# Patient Record
Sex: MALE | Race: WHITE | Employment: OTHER | URBAN - METROPOLITAN AREA
[De-identification: names, ages, dates, MRNs, and addresses within clinical notes are randomized per-mention and may not be internally consistent; named-entity substitution may affect disease eponyms.]

---

## 2020-10-12 ENCOUNTER — APPOINTMENT (OUTPATIENT)
Dept: GENERAL RADIOLOGY | Age: 75
End: 2020-10-12
Payer: COMMERCIAL

## 2020-10-12 ENCOUNTER — HOSPITAL ENCOUNTER (EMERGENCY)
Age: 75
Discharge: PSYCHIATRIC OR SUBSTANCE USE FACILITY | End: 2020-10-12
Attending: EMERGENCY MEDICINE
Payer: COMMERCIAL

## 2020-10-12 ENCOUNTER — APPOINTMENT (OUTPATIENT)
Dept: CT IMAGING | Age: 75
End: 2020-10-12
Payer: COMMERCIAL

## 2020-10-12 VITALS
HEART RATE: 86 BPM | DIASTOLIC BLOOD PRESSURE: 88 MMHG | TEMPERATURE: 97.7 F | OXYGEN SATURATION: 93 % | SYSTOLIC BLOOD PRESSURE: 183 MMHG | RESPIRATION RATE: 20 BRPM

## 2020-10-12 LAB
ANION GAP SERPL CALCULATED.3IONS-SCNC: 15 MMOL/L (ref 3–16)
BASE EXCESS VENOUS: 1.4 MMOL/L (ref -2–3)
BASOPHILS ABSOLUTE: 0 K/UL (ref 0–0.2)
BASOPHILS RELATIVE PERCENT: 0.1 %
BILIRUBIN URINE: NEGATIVE
BLOOD, URINE: ABNORMAL
BUN BLDV-MCNC: 37 MG/DL (ref 7–20)
CALCIUM SERPL-MCNC: 9.3 MG/DL (ref 8.3–10.6)
CARBOXYHEMOGLOBIN: 2.6 % (ref 0–1.5)
CHLORIDE BLD-SCNC: 106 MMOL/L (ref 99–110)
CLARITY: CLEAR
CO2: 24 MMOL/L (ref 21–32)
COLOR: YELLOW
CREAT SERPL-MCNC: 1.2 MG/DL (ref 0.8–1.3)
EKG ATRIAL RATE: 86 BPM
EKG DIAGNOSIS: NORMAL
EKG P AXIS: 50 DEGREES
EKG P-R INTERVAL: 174 MS
EKG Q-T INTERVAL: 384 MS
EKG QRS DURATION: 88 MS
EKG QTC CALCULATION (BAZETT): 459 MS
EKG R AXIS: -2 DEGREES
EKG T AXIS: 43 DEGREES
EKG VENTRICULAR RATE: 86 BPM
EOSINOPHILS ABSOLUTE: 0 K/UL (ref 0–0.6)
EOSINOPHILS RELATIVE PERCENT: 0 %
GFR AFRICAN AMERICAN: >60
GFR NON-AFRICAN AMERICAN: 59
GLUCOSE BLD-MCNC: 247 MG/DL (ref 70–99)
GLUCOSE URINE: NEGATIVE MG/DL
HCO3 VENOUS: 25.6 MMOL/L (ref 24–28)
HCT VFR BLD CALC: 43.9 % (ref 40.5–52.5)
HEMOGLOBIN, VEN, REDUCED: 26.9 %
HEMOGLOBIN: 14.8 G/DL (ref 13.5–17.5)
KETONES, URINE: ABNORMAL MG/DL
LACTIC ACID: 1.9 MMOL/L (ref 0.4–2)
LEUKOCYTE ESTERASE, URINE: NEGATIVE
LYMPHOCYTES ABSOLUTE: 0.4 K/UL (ref 1–5.1)
LYMPHOCYTES RELATIVE PERCENT: 2.9 %
MCH RBC QN AUTO: 29.8 PG (ref 26–34)
MCHC RBC AUTO-ENTMCNC: 33.6 G/DL (ref 31–36)
MCV RBC AUTO: 88.7 FL (ref 80–100)
METHEMOGLOBIN VENOUS: 0.4 % (ref 0–1.5)
MICROSCOPIC EXAMINATION: YES
MONOCYTES ABSOLUTE: 0.7 K/UL (ref 0–1.3)
MONOCYTES RELATIVE PERCENT: 5.2 %
NEUTROPHILS ABSOLUTE: 12.5 K/UL (ref 1.7–7.7)
NEUTROPHILS RELATIVE PERCENT: 91.8 %
NITRITE, URINE: NEGATIVE
O2 SAT, VEN: 72 %
PCO2, VEN: 40.8 MMHG (ref 41–51)
PDW BLD-RTO: 13.8 % (ref 12.4–15.4)
PH UA: 6 (ref 5–8)
PH VENOUS: 7.41 (ref 7.35–7.45)
PLATELET # BLD: 180 K/UL (ref 135–450)
PMV BLD AUTO: 8 FL (ref 5–10.5)
PO2, VEN: 39 MMHG (ref 25–40)
POTASSIUM REFLEX MAGNESIUM: 4.4 MMOL/L (ref 3.5–5.1)
PRO-BNP: 419 PG/ML (ref 0–449)
PROTEIN UA: NEGATIVE MG/DL
RBC # BLD: 4.95 M/UL (ref 4.2–5.9)
RBC UA: ABNORMAL /HPF (ref 0–4)
SODIUM BLD-SCNC: 145 MMOL/L (ref 136–145)
SPECIFIC GRAVITY UA: 1.02 (ref 1–1.03)
TCO2 CALC VENOUS: 27 MMOL/L
TROPONIN: <0.01 NG/ML
URINE REFLEX TO CULTURE: ABNORMAL
URINE TYPE: ABNORMAL
UROBILINOGEN, URINE: 0.2 E.U./DL
VALPROIC ACID LEVEL: <2.8 UG/ML (ref 50–100)
WBC # BLD: 13.7 K/UL (ref 4–11)
WBC UA: ABNORMAL /HPF (ref 0–5)

## 2020-10-12 PROCEDURE — 36415 COLL VENOUS BLD VENIPUNCTURE: CPT

## 2020-10-12 PROCEDURE — 93005 ELECTROCARDIOGRAM TRACING: CPT | Performed by: PHYSICIAN ASSISTANT

## 2020-10-12 PROCEDURE — 85025 COMPLETE CBC W/AUTO DIFF WBC: CPT

## 2020-10-12 PROCEDURE — 99285 EMERGENCY DEPT VISIT HI MDM: CPT

## 2020-10-12 PROCEDURE — 2580000003 HC RX 258: Performed by: PHYSICIAN ASSISTANT

## 2020-10-12 PROCEDURE — 83605 ASSAY OF LACTIC ACID: CPT

## 2020-10-12 PROCEDURE — 81001 URINALYSIS AUTO W/SCOPE: CPT

## 2020-10-12 PROCEDURE — 70450 CT HEAD/BRAIN W/O DYE: CPT

## 2020-10-12 PROCEDURE — U0003 INFECTIOUS AGENT DETECTION BY NUCLEIC ACID (DNA OR RNA); SEVERE ACUTE RESPIRATORY SYNDROME CORONAVIRUS 2 (SARS-COV-2) (CORONAVIRUS DISEASE [COVID-19]), AMPLIFIED PROBE TECHNIQUE, MAKING USE OF HIGH THROUGHPUT TECHNOLOGIES AS DESCRIBED BY CMS-2020-01-R: HCPCS

## 2020-10-12 PROCEDURE — 83880 ASSAY OF NATRIURETIC PEPTIDE: CPT

## 2020-10-12 PROCEDURE — 71045 X-RAY EXAM CHEST 1 VIEW: CPT

## 2020-10-12 PROCEDURE — 82803 BLOOD GASES ANY COMBINATION: CPT

## 2020-10-12 PROCEDURE — 80164 ASSAY DIPROPYLACETIC ACD TOT: CPT

## 2020-10-12 PROCEDURE — 80048 BASIC METABOLIC PNL TOTAL CA: CPT

## 2020-10-12 PROCEDURE — 84484 ASSAY OF TROPONIN QUANT: CPT

## 2020-10-12 RX ORDER — QUETIAPINE FUMARATE 25 MG/1
25 TABLET, FILM COATED ORAL 2 TIMES DAILY
Status: ON HOLD | COMMUNITY
End: 2020-10-20 | Stop reason: HOSPADM

## 2020-10-12 RX ORDER — 0.9 % SODIUM CHLORIDE 0.9 %
1000 INTRAVENOUS SOLUTION INTRAVENOUS ONCE
Status: COMPLETED | OUTPATIENT
Start: 2020-10-12 | End: 2020-10-12

## 2020-10-12 RX ORDER — QUETIAPINE FUMARATE 25 MG/1
50 TABLET, FILM COATED ORAL NIGHTLY
Status: ON HOLD | COMMUNITY
End: 2020-10-20 | Stop reason: HOSPADM

## 2020-10-12 RX ORDER — DIVALPROEX SODIUM 250 MG/1
250 TABLET, DELAYED RELEASE ORAL 2 TIMES DAILY
Status: ON HOLD | COMMUNITY
End: 2020-10-20 | Stop reason: HOSPADM

## 2020-10-12 RX ORDER — ACETAMINOPHEN 325 MG/1
650 TABLET ORAL EVERY 6 HOURS PRN
Status: ON HOLD | COMMUNITY
End: 2020-10-20 | Stop reason: HOSPADM

## 2020-10-12 RX ORDER — ESCITALOPRAM OXALATE 10 MG/1
10 TABLET ORAL DAILY
Status: ON HOLD | COMMUNITY
End: 2020-10-20 | Stop reason: HOSPADM

## 2020-10-12 RX ORDER — OYSTER SHELL CALCIUM WITH VITAMIN D 500; 200 MG/1; [IU]/1
1 TABLET, FILM COATED ORAL DAILY
Status: ON HOLD | COMMUNITY
End: 2020-10-20 | Stop reason: HOSPADM

## 2020-10-12 RX ORDER — TRAZODONE HYDROCHLORIDE 100 MG/1
50 TABLET ORAL NIGHTLY
Status: ON HOLD | COMMUNITY
End: 2020-10-16 | Stop reason: CLARIF

## 2020-10-12 RX ORDER — LORAZEPAM 2 MG/ML
1 INJECTION INTRAMUSCULAR EVERY 6 HOURS PRN
Status: ON HOLD | COMMUNITY
End: 2020-10-20 | Stop reason: HOSPADM

## 2020-10-12 RX ADMIN — SODIUM CHLORIDE 1000 ML: 0.9 INJECTION, SOLUTION INTRAVENOUS at 10:28

## 2020-10-12 NOTE — ED PROVIDER NOTES
[address] ENCOUNTER          PHYSICIAN ASSISTANT NOTE       Date of evaluation: 10/12/2020    Chief Complaint     Altered Mental Status (From THE [facility] w AMS and dehydration)      History of Present Illness     Divya Harper is a 66 y.o. male with a past medical history of Alzheimer's dementia who has been at THE [facility] for the last few days due to aggressive behavior presenting to the emergency department for reported altered mental status. Normally he is interactive with staff, able to ambulate on his own and walk to the bathroom. This morning per report from EMS and nursing staff he was only responsive to motor stimuli and refused to get out of bed. They are also concerned about dehydration. Patient is asking for water, otherwise has no complaints. No reported falls or injury. Otherwise patient is noncontributory to history. Vitals were normal per EMS report. Review of Systems     Review of Systems   Unable to perform ROS: Dementia       Past Medical, Surgical, Family, and Social History     He has a past medical history of Arthritis, Cancer ([address] and Glaucoma. He has a past surgical history that includes Yag capsulotomy (Left, 11.23.93); Cataract removal (Left, 09.05.1990); Cataract removal (Right, 01.16.1991); Heel spur surgery (Right, 1980); and Circumcision (1980). His family history includes Blindness in his mother; Cancer in his father; Cataracts in his father and mother; Diabetes in his father. He reports that he quit smoking about 49 years ago. He has never used smokeless tobacco. He reports that he does not drink alcohol or use drugs.     Medications     Discharge Medication List as of 10/12/2020  2:49 PM      CONTINUE these medications which have NOT CHANGED    Details   escitalopram (LEXAPRO) 10 MG tablet Take 10 mg by mouth dailyHistorical Med      calcium-vitamin D (OSCAL-500) 500-200 MG-UNIT per tablet Take 1 tablet by mouth Musculoskeletal: Normal range of motion. General: No swelling. Neurological:      Comments: No facial asymmetry, will state his name, not alert to place or time, moving all extremities spontaneously, gait was not assessed, pupils equal round and responsive, extraocular eye movements normal       Diagnostic Results     EKG   Interpreted in conjunction with emergency department physician Gordo Palomino MD  Rhythm: normal sinus   Rate: normal  Axis: normal  Ectopy: none  Conduction: normal  ST Segments: normal  T Waves: inversion in  v4, v5 and v6  Q Waves:none  Clinical Impression: Normal sinus rhythm, T wave inversion in leads V4 through V6 with flattening in inferior leads, no previous for comparison    RADIOLOGY:  CT Head WO Contrast   Final Result      1. No acute intracranial abnormality. 2. Mild nonspecific periventricular white matter hypodensity compatible with chronic small vessel ischemic disease. XR CHEST PORTABLE   Final Result      1. Minimal airspace disease right base. 2. Cardiomegaly.                 LABS:   Results for orders placed or performed during the hospital encounter of 10/12/20   CBC Auto Differential   Result Value Ref Range    WBC 13.7 (H) 4.0 - 11.0 K/uL    RBC 4.95 4.20 - 5.90 M/uL    Hemoglobin 14.8 13.5 - 17.5 g/dL    Hematocrit 43.9 40.5 - 52.5 %    MCV 88.7 80.0 - 100.0 fL    MCH 29.8 26.0 - 34.0 pg    MCHC 33.6 31.0 - 36.0 g/dL    RDW 13.8 12.4 - 15.4 %    Platelets 816 121 - 664 K/uL    MPV 8.0 5.0 - 10.5 fL    Neutrophils % 91.8 %    Lymphocytes % 2.9 %    Monocytes % 5.2 %    Eosinophils % 0.0 %    Basophils % 0.1 %    Neutrophils Absolute 12.5 (H) 1.7 - 7.7 K/uL    Lymphocytes Absolute 0.4 (L) 1.0 - 5.1 K/uL    Monocytes Absolute 0.7 0.0 - 1.3 K/uL    Eosinophils Absolute 0.0 0.0 - 0.6 K/uL    Basophils Absolute 0.0 0.0 - 0.2 K/uL   Basic Metabolic Panel w/ Reflex to MG   Result Value Ref Range    Sodium 145 136 - 145 mmol/L    Potassium reflex Magnesium 4.4 3.5 ms    P Axis 50 degrees    R Axis -2 degrees    T Axis 43 degrees    Diagnosis       EKG performed in ER and to be interpreted by ER physician. Confirmed by MD, ER ([id number]),  Elmer Bradshaw ([id number]) on 10/12/2020 10:37:35 AM       ED BEDSIDE ULTRASOUND:  None    RECENT VITALS:  BP: (!) 183/88, Temp: 97.7 °F (36.5 °C), Pulse: 86, Resp: 20, SpO2: 93 %     Procedures   None    ED Course     Nursing Notes, Past Medical Hx,Past Surgical Hx, Social Hx, Allergies, and Family Hx were reviewed. The patient was given the following medications:  Orders Placed This Encounter   Medications    0.9 % sodium chloride bolus       CONSULTS:  None    MEDICAL DECISION MAKING / ASSESSMENT / Bismark Tara is a 66 y.o. male presenting to the emergency department for evaluation of altered mental status, sent from THE [facility] where he is currently inpatient for aggression that was noted by his family members. He is having his medications adjusted, and this morning staff thought that he was slightly altered. He was only responsive to motor stimuli per report from THE [facility], although he was cooperative with exam in the ED, would open his eyes, moving all of his extremities spontaneously, alert to person, not place or time. He was intermittently compliant with exam, occasionally combative. High suspicion for new medications as a cause of his reported mental status changes over the last day while at the nursing facility. He is being treated for agitation and aggression. Work-up today showed no significant abnormalities. CT head completed showing no acute intracranial abnormality with chronic small vessel ischemic disease. Chest x-ray did show minimal airspace disease at the right base with no reported cough, no shortness of breath, and no hypoxia seen on exam.  Oxygen saturations of 93 were documented although he was high 90s throughout my encounter. Covid testing was also sent and pending.   BNP normal, single troponin drawn and normal.  Urinalysis showed no evidence of urinary tract infection. No obvious source of infection identified as cause of change in mental status today. Felt that he was appropriate to return back to [facility] with strict return precautions. Did discuss repeat vitals with the nursing staff, although they are not documented did state that there was improvement in blood pressure with normal heart rate. This patient was also evaluated by the attending physician. All care plans were discussed and agreed upon. Clinical Impression     1. Altered mental status, unspecified altered mental status type        Disposition     PATIENT REFERRED TO:  No follow-up provider specified.     DISCHARGE MEDICATIONS:  Discharge Medication List as of 10/12/2020  2:49 PM          DISPOSITION         Kisha Cat PA-C  10/12/20 5527

## 2020-10-12 NOTE — ED PROVIDER NOTES
ED Attending Attestation Note     Date of evaluation: 10/12/2020    This patient was seen by the advance practice provider. I have seen and examined the patient, agree with the workup, evaluation, management and diagnosis. The care plan has been discussed. I have reviewed the ECG and concur with the MILLA's interpretation. My assessment reveals an elderly patient, confused but with no focal neurologic deficits, in no acute distress. He is brought to the emergency department from THE [facility], with decreased mental status and interactivity after recently having been admitted there for behavioral difficulties, with violent and aggressive behavior. It appears that he has recently been started on several sedating medications for behavior control at THE [facility], which is likely contributing to his alteration of mental status. Infectious and metabolic abnormalities are also being evaluated for.          Gordo Palomino MD  10/12/20 1536

## 2020-10-12 NOTE — ED NOTES
Report called to JAIDA Petersen at THE [facility].       Alexy Ortiz RN  10/12/20 7206

## 2020-10-13 PROCEDURE — U0003 INFECTIOUS AGENT DETECTION BY NUCLEIC ACID (DNA OR RNA); SEVERE ACUTE RESPIRATORY SYNDROME CORONAVIRUS 2 (SARS-COV-2) (CORONAVIRUS DISEASE [COVID-19]), AMPLIFIED PROBE TECHNIQUE, MAKING USE OF HIGH THROUGHPUT TECHNOLOGIES AS DESCRIBED BY CMS-2020-01-R: HCPCS

## 2020-10-14 LAB — SARS-COV-2, NAA: NOT DETECTED

## 2020-10-16 ENCOUNTER — HOSPITAL ENCOUNTER (INPATIENT)
Age: 75
LOS: 4 days | Discharge: HOSPICE | DRG: 682 | End: 2020-10-20
Attending: EMERGENCY MEDICINE | Admitting: INTERNAL MEDICINE
Payer: MEDICARE

## 2020-10-16 ENCOUNTER — APPOINTMENT (OUTPATIENT)
Dept: CT IMAGING | Age: 75
DRG: 682 | End: 2020-10-16
Payer: MEDICARE

## 2020-10-16 PROBLEM — N17.9 AKI (ACUTE KIDNEY INJURY) (HCC): Status: ACTIVE | Noted: 2020-10-16

## 2020-10-16 LAB
ALBUMIN SERPL-MCNC: 4.1 G/DL (ref 3.4–5)
ALP BLD-CCNC: 111 U/L (ref 40–129)
ALT SERPL-CCNC: 29 U/L (ref 10–40)
ANION GAP SERPL CALCULATED.3IONS-SCNC: 15 MMOL/L (ref 3–16)
AST SERPL-CCNC: 17 U/L (ref 15–37)
BACTERIA: ABNORMAL /HPF
BASE EXCESS VENOUS: 0.1 MMOL/L (ref -2–3)
BASOPHILS ABSOLUTE: 0 K/UL (ref 0–0.2)
BASOPHILS RELATIVE PERCENT: 0.2 %
BILIRUB SERPL-MCNC: 0.9 MG/DL (ref 0–1)
BILIRUBIN DIRECT: <0.2 MG/DL (ref 0–0.3)
BILIRUBIN URINE: NEGATIVE
BILIRUBIN, INDIRECT: NORMAL MG/DL (ref 0–1)
BLOOD, URINE: ABNORMAL
BUN BLDV-MCNC: 103 MG/DL (ref 7–20)
CALCIUM SERPL-MCNC: 9.2 MG/DL (ref 8.3–10.6)
CARBOXYHEMOGLOBIN: 1.5 % (ref 0–1.5)
CHLORIDE BLD-SCNC: 110 MMOL/L (ref 99–110)
CLARITY: CLEAR
CO2: 23 MMOL/L (ref 21–32)
COLOR: YELLOW
CREAT SERPL-MCNC: 3.8 MG/DL (ref 0.8–1.3)
EKG ATRIAL RATE: 73 BPM
EKG DIAGNOSIS: NORMAL
EKG P AXIS: 62 DEGREES
EKG P-R INTERVAL: 156 MS
EKG Q-T INTERVAL: 408 MS
EKG QRS DURATION: 92 MS
EKG QTC CALCULATION (BAZETT): 449 MS
EKG R AXIS: 17 DEGREES
EKG T AXIS: -9 DEGREES
EKG VENTRICULAR RATE: 73 BPM
EOSINOPHILS ABSOLUTE: 0 K/UL (ref 0–0.6)
EOSINOPHILS RELATIVE PERCENT: 0 %
GFR AFRICAN AMERICAN: 19
GFR NON-AFRICAN AMERICAN: 16
GLUCOSE BLD-MCNC: 207 MG/DL (ref 70–99)
GLUCOSE BLD-MCNC: 248 MG/DL (ref 70–99)
GLUCOSE BLD-MCNC: 252 MG/DL (ref 70–99)
GLUCOSE URINE: 100 MG/DL
HCO3 VENOUS: 25.2 MMOL/L (ref 24–28)
HCT VFR BLD CALC: 38.5 % (ref 40.5–52.5)
HEMOGLOBIN, VEN, REDUCED: 61.1 %
HEMOGLOBIN: 12.9 G/DL (ref 13.5–17.5)
KETONES, URINE: NEGATIVE MG/DL
LACTIC ACID: 2 MMOL/L (ref 0.4–2)
LEUKOCYTE ESTERASE, URINE: ABNORMAL
LIPASE: 40 U/L (ref 13–60)
LYMPHOCYTES ABSOLUTE: 0.3 K/UL (ref 1–5.1)
LYMPHOCYTES RELATIVE PERCENT: 3.3 %
MCH RBC QN AUTO: 30.2 PG (ref 26–34)
MCHC RBC AUTO-ENTMCNC: 33.6 G/DL (ref 31–36)
MCV RBC AUTO: 90 FL (ref 80–100)
METHEMOGLOBIN VENOUS: 0.8 % (ref 0–1.5)
MICROSCOPIC EXAMINATION: YES
MONOCYTES ABSOLUTE: 0.9 K/UL (ref 0–1.3)
MONOCYTES RELATIVE PERCENT: 8.3 %
NEUTROPHILS ABSOLUTE: 9.2 K/UL (ref 1.7–7.7)
NEUTROPHILS RELATIVE PERCENT: 88.2 %
NITRITE, URINE: NEGATIVE
O2 SAT, VEN: 38 %
PCO2, VEN: 45.3 MMHG (ref 41–51)
PDW BLD-RTO: 13.6 % (ref 12.4–15.4)
PERFORMED ON: ABNORMAL
PERFORMED ON: ABNORMAL
PH UA: 6 (ref 5–8)
PH VENOUS: 7.36 (ref 7.35–7.45)
PLATELET # BLD: 146 K/UL (ref 135–450)
PMV BLD AUTO: 8.6 FL (ref 5–10.5)
PO2, VEN: 23.6 MMHG (ref 25–40)
POTASSIUM REFLEX MAGNESIUM: 4.2 MMOL/L (ref 3.5–5.1)
PROTEIN UA: NEGATIVE MG/DL
RBC # BLD: 4.28 M/UL (ref 4.2–5.9)
RBC UA: ABNORMAL /HPF (ref 0–4)
SODIUM BLD-SCNC: 148 MMOL/L (ref 136–145)
SPECIFIC GRAVITY UA: 1.02 (ref 1–1.03)
TCO2 CALC VENOUS: 27 MMOL/L
TOTAL PROTEIN: 6.7 G/DL (ref 6.4–8.2)
URINE TYPE: ABNORMAL
UROBILINOGEN, URINE: 0.2 E.U./DL
WBC # BLD: 10.5 K/UL (ref 4–11)
WBC UA: ABNORMAL /HPF (ref 0–5)

## 2020-10-16 PROCEDURE — 6360000002 HC RX W HCPCS: Performed by: STUDENT IN AN ORGANIZED HEALTH CARE EDUCATION/TRAINING PROGRAM

## 2020-10-16 PROCEDURE — 6370000000 HC RX 637 (ALT 250 FOR IP): Performed by: INTERNAL MEDICINE

## 2020-10-16 PROCEDURE — 1200000000 HC SEMI PRIVATE

## 2020-10-16 PROCEDURE — U0003 INFECTIOUS AGENT DETECTION BY NUCLEIC ACID (DNA OR RNA); SEVERE ACUTE RESPIRATORY SYNDROME CORONAVIRUS 2 (SARS-COV-2) (CORONAVIRUS DISEASE [COVID-19]), AMPLIFIED PROBE TECHNIQUE, MAKING USE OF HIGH THROUGHPUT TECHNOLOGIES AS DESCRIBED BY CMS-2020-01-R: HCPCS

## 2020-10-16 PROCEDURE — 85025 COMPLETE CBC W/AUTO DIFF WBC: CPT

## 2020-10-16 PROCEDURE — U0002 COVID-19 LAB TEST NON-CDC: HCPCS

## 2020-10-16 PROCEDURE — 82803 BLOOD GASES ANY COMBINATION: CPT

## 2020-10-16 PROCEDURE — 96375 TX/PRO/DX INJ NEW DRUG ADDON: CPT

## 2020-10-16 PROCEDURE — 2580000003 HC RX 258: Performed by: STUDENT IN AN ORGANIZED HEALTH CARE EDUCATION/TRAINING PROGRAM

## 2020-10-16 PROCEDURE — 93005 ELECTROCARDIOGRAM TRACING: CPT | Performed by: STUDENT IN AN ORGANIZED HEALTH CARE EDUCATION/TRAINING PROGRAM

## 2020-10-16 PROCEDURE — 2580000003 HC RX 258: Performed by: INTERNAL MEDICINE

## 2020-10-16 PROCEDURE — 74176 CT ABD & PELVIS W/O CONTRAST: CPT

## 2020-10-16 PROCEDURE — 83690 ASSAY OF LIPASE: CPT

## 2020-10-16 PROCEDURE — 83605 ASSAY OF LACTIC ACID: CPT

## 2020-10-16 PROCEDURE — 87086 URINE CULTURE/COLONY COUNT: CPT

## 2020-10-16 PROCEDURE — 6360000002 HC RX W HCPCS: Performed by: INTERNAL MEDICINE

## 2020-10-16 PROCEDURE — 99285 EMERGENCY DEPT VISIT HI MDM: CPT

## 2020-10-16 PROCEDURE — 80076 HEPATIC FUNCTION PANEL: CPT

## 2020-10-16 PROCEDURE — 96374 THER/PROPH/DIAG INJ IV PUSH: CPT

## 2020-10-16 PROCEDURE — 2580000003 HC RX 258

## 2020-10-16 PROCEDURE — 51702 INSERT TEMP BLADDER CATH: CPT

## 2020-10-16 PROCEDURE — 81001 URINALYSIS AUTO W/SCOPE: CPT

## 2020-10-16 PROCEDURE — 80048 BASIC METABOLIC PNL TOTAL CA: CPT

## 2020-10-16 RX ORDER — ACETAMINOPHEN 325 MG/1
650 TABLET ORAL EVERY 6 HOURS PRN
Status: DISCONTINUED | OUTPATIENT
Start: 2020-10-16 | End: 2020-10-20 | Stop reason: HOSPADM

## 2020-10-16 RX ORDER — DEXTROSE MONOHYDRATE 25 G/50ML
12.5 INJECTION, SOLUTION INTRAVENOUS PRN
Status: DISCONTINUED | OUTPATIENT
Start: 2020-10-16 | End: 2020-10-20 | Stop reason: HOSPADM

## 2020-10-16 RX ORDER — POLYETHYLENE GLYCOL 3350 17 G/17G
17 POWDER, FOR SOLUTION ORAL DAILY PRN
Status: DISCONTINUED | OUTPATIENT
Start: 2020-10-16 | End: 2020-10-20 | Stop reason: HOSPADM

## 2020-10-16 RX ORDER — LORAZEPAM 2 MG/ML
1 INJECTION INTRAMUSCULAR EVERY 6 HOURS PRN
Status: DISCONTINUED | OUTPATIENT
Start: 2020-10-16 | End: 2020-10-19

## 2020-10-16 RX ORDER — SODIUM CHLORIDE 0.9 % (FLUSH) 0.9 %
10 SYRINGE (ML) INJECTION PRN
Status: DISCONTINUED | OUTPATIENT
Start: 2020-10-16 | End: 2020-10-20 | Stop reason: HOSPADM

## 2020-10-16 RX ORDER — ZIPRASIDONE MESYLATE 20 MG/ML
10 INJECTION, POWDER, LYOPHILIZED, FOR SOLUTION INTRAMUSCULAR ONCE
Status: COMPLETED | OUTPATIENT
Start: 2020-10-16 | End: 2020-10-16

## 2020-10-16 RX ORDER — HEPARIN SODIUM 5000 [USP'U]/ML
5000 INJECTION, SOLUTION INTRAVENOUS; SUBCUTANEOUS EVERY 8 HOURS SCHEDULED
Status: DISCONTINUED | OUTPATIENT
Start: 2020-10-16 | End: 2020-10-20 | Stop reason: HOSPADM

## 2020-10-16 RX ORDER — INSULIN LISPRO 100 [IU]/ML
0-3 INJECTION, SOLUTION INTRAVENOUS; SUBCUTANEOUS NIGHTLY
Status: DISCONTINUED | OUTPATIENT
Start: 2020-10-16 | End: 2020-10-20 | Stop reason: HOSPADM

## 2020-10-16 RX ORDER — LORAZEPAM 1 MG/1
1 TABLET ORAL EVERY 6 HOURS PRN
Status: ON HOLD | COMMUNITY
End: 2020-10-20 | Stop reason: HOSPADM

## 2020-10-16 RX ORDER — CEPHALEXIN 500 MG/1
500 CAPSULE ORAL 4 TIMES DAILY
Status: ON HOLD | COMMUNITY
Start: 2020-10-15 | End: 2020-10-20 | Stop reason: HOSPADM

## 2020-10-16 RX ORDER — SODIUM CHLORIDE, SODIUM LACTATE, POTASSIUM CHLORIDE, CALCIUM CHLORIDE 600; 310; 30; 20 MG/100ML; MG/100ML; MG/100ML; MG/100ML
INJECTION, SOLUTION INTRAVENOUS
Status: COMPLETED
Start: 2020-10-16 | End: 2020-10-16

## 2020-10-16 RX ORDER — SODIUM CHLORIDE 0.9 % (FLUSH) 0.9 %
10 SYRINGE (ML) INJECTION EVERY 12 HOURS SCHEDULED
Status: DISCONTINUED | OUTPATIENT
Start: 2020-10-16 | End: 2020-10-20 | Stop reason: HOSPADM

## 2020-10-16 RX ORDER — MIDAZOLAM HYDROCHLORIDE 1 MG/ML
2 INJECTION INTRAMUSCULAR; INTRAVENOUS ONCE
Status: COMPLETED | OUTPATIENT
Start: 2020-10-16 | End: 2020-10-16

## 2020-10-16 RX ORDER — TRAZODONE HYDROCHLORIDE 50 MG/1
25 TABLET ORAL NIGHTLY PRN
Status: ON HOLD | COMMUNITY
End: 2020-10-20 | Stop reason: HOSPADM

## 2020-10-16 RX ORDER — ACETAMINOPHEN 650 MG/1
650 SUPPOSITORY RECTAL EVERY 6 HOURS PRN
Status: DISCONTINUED | OUTPATIENT
Start: 2020-10-16 | End: 2020-10-20 | Stop reason: HOSPADM

## 2020-10-16 RX ORDER — NICOTINE POLACRILEX 4 MG
15 LOZENGE BUCCAL PRN
Status: DISCONTINUED | OUTPATIENT
Start: 2020-10-16 | End: 2020-10-20 | Stop reason: HOSPADM

## 2020-10-16 RX ORDER — IBUPROFEN 200 MG
TABLET ORAL 4 TIMES DAILY
Status: ON HOLD | COMMUNITY
End: 2020-10-20 | Stop reason: HOSPADM

## 2020-10-16 RX ORDER — INSULIN LISPRO 100 [IU]/ML
0-6 INJECTION, SOLUTION INTRAVENOUS; SUBCUTANEOUS
Status: DISCONTINUED | OUTPATIENT
Start: 2020-10-16 | End: 2020-10-20 | Stop reason: HOSPADM

## 2020-10-16 RX ORDER — ONDANSETRON 2 MG/ML
4 INJECTION INTRAMUSCULAR; INTRAVENOUS EVERY 6 HOURS PRN
Status: DISCONTINUED | OUTPATIENT
Start: 2020-10-16 | End: 2020-10-20 | Stop reason: HOSPADM

## 2020-10-16 RX ORDER — DEXTROSE MONOHYDRATE 50 MG/ML
100 INJECTION, SOLUTION INTRAVENOUS PRN
Status: DISCONTINUED | OUTPATIENT
Start: 2020-10-16 | End: 2020-10-20 | Stop reason: HOSPADM

## 2020-10-16 RX ORDER — PROMETHAZINE HYDROCHLORIDE 25 MG/1
12.5 TABLET ORAL EVERY 6 HOURS PRN
Status: DISCONTINUED | OUTPATIENT
Start: 2020-10-16 | End: 2020-10-20 | Stop reason: HOSPADM

## 2020-10-16 RX ADMIN — INSULIN LISPRO 1 UNITS: 100 INJECTION, SOLUTION INTRAVENOUS; SUBCUTANEOUS at 21:12

## 2020-10-16 RX ADMIN — Medication 10 ML: at 14:57

## 2020-10-16 RX ADMIN — SODIUM CHLORIDE, POTASSIUM CHLORIDE, SODIUM LACTATE AND CALCIUM CHLORIDE: 600; 310; 30; 20 INJECTION, SOLUTION INTRAVENOUS at 15:13

## 2020-10-16 RX ADMIN — MIDAZOLAM 2 MG: 1 INJECTION INTRAMUSCULAR; INTRAVENOUS at 14:57

## 2020-10-16 RX ADMIN — ZIPRASIDONE MESYLATE 10 MG: 20 INJECTION, POWDER, LYOPHILIZED, FOR SOLUTION INTRAMUSCULAR at 11:48

## 2020-10-16 RX ADMIN — LORAZEPAM 1 MG: 2 INJECTION INTRAMUSCULAR; INTRAVENOUS at 18:50

## 2020-10-16 RX ADMIN — SODIUM CHLORIDE, POTASSIUM CHLORIDE, SODIUM LACTATE AND CALCIUM CHLORIDE 1000 ML: 600; 310; 30; 20 INJECTION, SOLUTION INTRAVENOUS at 15:13

## 2020-10-16 RX ADMIN — Medication 10 ML: at 21:11

## 2020-10-16 RX ADMIN — CEFTRIAXONE 1 G: 1 INJECTION, POWDER, FOR SOLUTION INTRAMUSCULAR; INTRAVENOUS at 18:13

## 2020-10-16 RX ADMIN — INSULIN LISPRO 3 UNITS: 100 INJECTION, SOLUTION INTRAVENOUS; SUBCUTANEOUS at 19:02

## 2020-10-16 RX ADMIN — HEPARIN SODIUM 5000 UNITS: 5000 INJECTION INTRAVENOUS; SUBCUTANEOUS at 21:21

## 2020-10-16 ASSESSMENT — PAIN DESCRIPTION - LOCATION: LOCATION: ABDOMEN

## 2020-10-16 ASSESSMENT — PAIN SCALES - PAIN ASSESSMENT IN ADVANCED DEMENTIA (PAINAD)
BODYLANGUAGE: 0
TOTALSCORE: 0
BODYLANGUAGE: 0
BREATHING: 0
FACIALEXPRESSION: 0
NEGVOCALIZATION: 0
CONSOLABILITY: 0
NEGVOCALIZATION: 0
NEGVOCALIZATION: 0
TOTALSCORE: 0
FACIALEXPRESSION: 0
CONSOLABILITY: 0
BODYLANGUAGE: 0
BODYLANGUAGE: 0
TOTALSCORE: 0
NEGVOCALIZATION: 0
TOTALSCORE: 0
FACIALEXPRESSION: 0
BREATHING: 0
FACIALEXPRESSION: 0
CONSOLABILITY: 0
BREATHING: 0
BODYLANGUAGE: 0
NEGVOCALIZATION: 0
FACIALEXPRESSION: 0
CONSOLABILITY: 0
BREATHING: 0
CONSOLABILITY: 0
BODYLANGUAGE: 0
TOTALSCORE: 0
TOTALSCORE: 0
FACIALEXPRESSION: 0
NEGVOCALIZATION: 0
BREATHING: 0
CONSOLABILITY: 0
BREATHING: 0

## 2020-10-16 ASSESSMENT — PAIN SCALES - GENERAL: PAINLEVEL_OUTOF10: 0

## 2020-10-16 ASSESSMENT — PAIN SCALES - WONG BAKER
WONGBAKER_NUMERICALRESPONSE: 0
WONGBAKER_NUMERICALRESPONSE: 6
WONGBAKER_NUMERICALRESPONSE: 0

## 2020-10-16 ASSESSMENT — PAIN DESCRIPTION - PAIN TYPE: TYPE: ACUTE PAIN

## 2020-10-16 NOTE — H&P
mouth nightly    Historical Provider, MD   magnesium hydroxide (MILK OF MAGNESIA) 400 MG/5ML suspension Take 30 mLs by mouth daily as needed for Constipation    Historical Provider, MD   acetaminophen (TYLENOL) 325 MG tablet Take 650 mg by mouth every 6 hours as needed for Pain    Historical Provider, MD   Multiple Vitamins-Minerals (THERAPEUTIC MULTIVITAMIN-MINERALS) tablet Take 1 tablet by mouth daily. Historical Provider, MD   tobramycin-dexamethasone [facility]) ophthalmic suspension Place 1 drop into the left eye 4 times daily. 3/14/14   Moni Hussein MD       Allergies:  Vioxx [rofecoxib]; Atorvastatin; and Nsaids    Social History:      The patient currently lives in facility    TOBACCO:   reports that he quit smoking about 49 years ago. He has never used smokeless tobacco.  ETOH:   reports no history of alcohol use. Family History:      Reviewed in detail and positive as follows:        Problem Relation Age of Onset    Blindness Mother     Cataracts Mother     Diabetes Father     Cataracts Father     Cancer Father        REVIEW OF SYSTEMS:   Pertinent positives as noted in the HPI. All other systems reviewed and negative. PHYSICAL EXAM:    BP (!) 173/91   Pulse 74   Temp 98.3 °F (36.8 °C) (Axillary)   Resp 16   Ht 5' 10\" (1.778 m)   Wt 178 lb 7 oz (80.9 kg)   SpO2 99%   BMI 25.60 kg/m²     General appearance: No distress  HEENT: Normal cephalic, atraumatic without obvious deformity. Pupils equal, round, and reactive to light. Extra ocular muscles intact. Conjunctivae/corneas clear. Neck: Supple, with full range of motion. No jugular venous distention. Trachea midline. Respiratory:  Normal respiratory effort. Clear to auscultation, bilaterally without Rales/Wheezes/Rhonchi. Cardiovascular: Regular rate and rhythm with normal S1/S2 without murmurs, rubs or gallops. Abdomen: Soft, non-tender, non-distended with normal bowel sounds.   Musculoskelatal: No clubbing, cyanosis or edema bilaterally. Full range of motion without deformity. Skin: Skin color, texture, turgor normal.  No rashes or lesions. Neurologic:  Nonverbal, no gross deficits. Labs:     Recent Labs     10/16/20  1243   WBC 10.5   HGB 12.9*   HCT 38.5*        Recent Labs     10/16/20  1243   *   K 4.2      CO2 23   *   CREATININE 3.8*   CALCIUM 9.2     Recent Labs     10/16/20  1243   AST 17   ALT 29   BILIDIR <0.2   BILITOT 0.9   ALKPHOS 111     No results for input(s): INR in the last 72 hours. No results for input(s): Ray Seed in the last 72 hours. Urinalysis:      Lab Results   Component Value Date    NITRU Negative 10/16/2020    WBCUA 10-20 10/16/2020    BACTERIA 2+ 10/16/2020    RBCUA  10/16/2020    BLOODU LARGE 10/16/2020    SPECGRAV 1.025 10/16/2020    GLUCOSEU 100 10/16/2020       Studies:  CT ABDOMEN PELVIS WO CONTRAST Additional Contrast? None   Final Result      1. Mild to moderate bilateral hydronephrosis, right slightly greater than left. Moderate perinephric stranding on the right with small to moderate amount of posterior pararenal fluid. Proximal ureters are bilaterally prominent. No obstructing stones. 2. Marked distention of the urinary bladder. 3. Mild prostate enlargement. 4. Prior cholecystectomy. 5. Left adrenal nodule consistent with adenoma. ASSESSMENT:    Active Hospital Problems    Diagnosis Date Noted    YANN (acute kidney injury) ([facility] [address] [N17.9] 10/16/2020   Severe Dementia  Abdominal pain secondary to urinary retention may be secondary to antipsychotics, infection  Pyelonephritis    PLAN:    Continue Harris  Continue IV fluids  Start Rocephin  Pharmacy consult for medication reconciliation   Insulin sliding scale    Called and spoke with his wife who reports he would never want to have his life prolonged with his severe dementia. Discussed code status.  She would like him to be [facility], will focus on treatment geared towards making him more comfortable so he can return to his facility. DVT Prophylaxis: Heparin subq  Diet: No diet orders on file bedside swallow (pt on general diet at facility)  Code Status: No Order DNR-CC    PT/OT Eval Status:     Dispo - Inpatient    Sarah Agee DO    Thank you Giovanny Olson for the opportunity to be involved in this patient's care. If you have any questions or concerns please feel free to contact me at [employer]serve.

## 2020-10-16 NOTE — ED NOTES
Condom cath applied to patient and connected to standard drainage bag     Ginny Freeman RN  10/16/20 [id number]

## 2020-10-16 NOTE — PROGRESS NOTES
4 Eyes Admission Assessment     Jeramie aware to sign off skin assessment    I agree as the admission nurse that 2 RN's have performed a thorough Head to Toe Skin Assessment on the patient. ALL assessment sites listed below have been assessed on admission. Areas assessed by both nurses: Kelley Guthrie and Jeramie  [x]   Head, Face, and Ears   [x]   Shoulders, Back, and Chest  [x]   Arms, Elbows, and Hands ; scattered bruising and abrasions  [x]   Coccyx, Sacrum, and Ischium  [x]   Legs, Feet, and Heels, scattered abrasions on top of toes, bilat shins, scattered bruising      Does the Patient have Skin Breakdown?   No         Amandeep Prevention initiated:  Yes   Wound Care Orders initiated:  No      WOC nurse consulted for Pressure Injury (Stage 3,4, Unstageable, DTI, NWPT, and Complex wounds) or Amandeep score 18 or lower:  No      Nurse 1 eSignature: Electronically signed by Rejeana Scheuermann, RN on 10/16/20 at 7:58 PM EDT    **SHARE this note so that the co-signing nurse is able to place an eSignature**    Nurse 2 eSignature: {Esignature:[id number]}

## 2020-10-16 NOTE — ED NOTES
Bed: A04-04  Expected date: 10/16/20  Expected time:   Means of arrival:   Comments:  Russell Peña RN  10/16/20 [id number]

## 2020-10-16 NOTE — PLAN OF CARE
potassium, sodium, calcium, phosphorus, and pH  Outcome: Met This Shift     Problem: Restraint Use - Nonviolent/Non-Self-Destructive Behavior:  Goal: Absence of restraint indications  Description: Absence of restraint indications  Outcome: Met This Shift  Note: See flow sheet and orders ativan x 1 given from prn order      Problem: Restraint Use - Nonviolent/Non-Self-Destructive Behavior:  Goal: Absence of restraint-related injury  Description: Absence of restraint-related injury  Outcome: Met This Shift     Problem: Loneliness or Risk for Loneliness  Goal: Demonstrate positive use of time alone when socialization is not possible  Outcome: Ongoing     Problem: Fatigue  Goal: Verbalize increase energy and improved vitality  Outcome: Ongoing     Problem: Patient Education: Go to Patient Education Activity  Goal: Patient/Family Education  Outcome: Ongoing

## 2020-10-16 NOTE — ED PROVIDER NOTES
[id number] [facility] RESIDENT NOTE       Date of evaluation: 10/16/2020    Chief Complaint     Abdominal Pain (Sent from [facility] with report of \"abdominal pain in all 4 quadrants\"  Pt agressive and screaming out on arrival. Doesn't answer questions. Hx of dementia. Sitter at bedside states he hasn't been eating or drinking much)      of Present Illness     Nerissa Howell is a 66 y.o. male who presents with concern for abdominal pain. The patient arrives via EMS from nursing home. A representative of the facility presents at bedside and is able to assist with the history. Per review of documentation the patient reportedly is residing at this location out of concern for aggressive behavior that precluded him remaining at home. He does have a history of dementia. Per the caregiver he has been observed clutching his abdomen for the last 24 hours and seems in increasing distress. This prompted his transport to the emergency department. He reportedly has had baseline dietary intake and hydration as well as GI  function. He reportedly has been afebrile without congestion cough. Secondary to the patient's history of dementia he is unable to provide a history of present illness or review of systems. Review of Systems     Unable to obtain    Past Medical, Surgical, Family, and Social History     He has a past medical history of Arthritis, Cancer ([address] and Glaucoma. He has a past surgical history that includes Yag capsulotomy (Left, 11.23.93); Cataract removal (Left, 09.05.1990); Cataract removal (Right, 01.16.1991); Heel spur surgery (Right, 1980); and Circumcision (1980). His family history includes Blindness in his mother; Cancer in his father; Cataracts in his father and mother; Diabetes in his father. He reports that he quit smoking about 49 years ago. He has never used smokeless tobacco. He reports that he does not drink alcohol or use drugs.     Medications Previous Medications    ACETAMINOPHEN (TYLENOL) 325 MG TABLET    Take 650 mg by mouth every 6 hours as needed for Pain    CALCIUM-VITAMIN D (OSCAL-500) 500-200 MG-UNIT PER TABLET    Take 1 tablet by mouth daily    DIVALPROEX (DEPAKOTE) 250 MG DR TABLET    Take 250 mg by mouth 2 times daily    ESCITALOPRAM (LEXAPRO) 10 MG TABLET    Take 10 mg by mouth daily    LORAZEPAM (ATIVAN) 2 MG/ML INJECTION    Inject 1 mg into the muscle every 8 hours as needed (agitation). May inject 0.5-1mg PRN for anxiety agitation Q8h PRN    MAGNESIUM HYDROXIDE (MILK OF MAGNESIA) 400 MG/5ML SUSPENSION    Take 30 mLs by mouth daily as needed for Constipation    MULTIPLE VITAMINS-MINERALS (THERAPEUTIC MULTIVITAMIN-MINERALS) TABLET    Take 1 tablet by mouth daily. QUETIAPINE (SEROQUEL) 25 MG TABLET    Take 25 mg by mouth 2 times daily    QUETIAPINE (SEROQUEL) 25 MG TABLET    Take 50 mg by mouth nightly    TOBRAMYCIN-DEXAMETHASONE (TOBRADEX) OPHTHALMIC SUSPENSION    Place 1 drop into the left eye 4 times daily. TRAZODONE (DESYREL) 100 MG TABLET    Take 50 mg by mouth nightly       Allergies     He is allergic to vioxx [rofecoxib]; atorvastatin; and nsaids.     Physical Exam     INITIAL VITALS: BP: (!) 166/89, Temp: 98.3 °F (36.8 °C), Pulse: 90, Resp: 18, SpO2: 97 %     General: Uncomfortable appearing agitated and yelling out  Skin: warm, dry and pink; superficial abrasions to bilateral lower extremities; red dwayne appreciated left posterior back  Head: normocephalic atraumatic  Eyes: Pupils are misshapen but equal EOMI  Mouth / Pharynx: moist mucus membranes without erythema or lesions noted  Neck: full range of motion without meningismus  Chest: no external findings or tenderness; clear to auscultation  Heart: regular normal S1 and S2 without murmur  noted  Abdomen: Abdomen is soft and nondistended however diffusely tender  Extremities: well perfused with palpable distal pulses; full range of motion grossly intact, no edema      DiagnosticResults     EKG   Sinus rhythm at a rate of 73 without ectopy. Normal axis and intervals. There is no evidence of acute ischemic change. EKG is effectively unchanged when compared to October 12, 2020. RADIOLOGY:  CT ABDOMEN PELVIS WO CONTRAST Additional Contrast? None   Final Result      1. Mild to moderate bilateral hydronephrosis, right slightly greater than left. Moderate perinephric stranding on the right with small to moderate amount of posterior pararenal fluid. Proximal ureters are bilaterally prominent. No obstructing stones. 2. Marked distention of the urinary bladder. 3. Mild prostate enlargement. 4. Prior cholecystectomy. 5. Left adrenal nodule consistent with adenoma.              LABS:   Results for orders placed or performed during the hospital encounter of 10/16/20   CBC Auto Differential   Result Value Ref Range    WBC 10.5 4.0 - 11.0 K/uL    RBC 4.28 4.20 - 5.90 M/uL    Hemoglobin 12.9 (L) 13.5 - 17.5 g/dL    Hematocrit 38.5 (L) 40.5 - 52.5 %    MCV 90.0 80.0 - 100.0 fL    MCH 30.2 26.0 - 34.0 pg    MCHC 33.6 31.0 - 36.0 g/dL    RDW 13.6 12.4 - 15.4 %    Platelets 939 185 - 200 K/uL    MPV 8.6 5.0 - 10.5 fL    Neutrophils % 88.2 %    Lymphocytes % 3.3 %    Monocytes % 8.3 %    Eosinophils % 0.0 %    Basophils % 0.2 %    Neutrophils Absolute 9.2 (H) 1.7 - 7.7 K/uL    Lymphocytes Absolute 0.3 (L) 1.0 - 5.1 K/uL    Monocytes Absolute 0.9 0.0 - 1.3 K/uL    Eosinophils Absolute 0.0 0.0 - 0.6 K/uL    Basophils Absolute 0.0 0.0 - 0.2 K/uL   Basic Metabolic Panel w/ Reflex to MG   Result Value Ref Range    Sodium 148 (H) 136 - 145 mmol/L    Potassium reflex Magnesium 4.2 3.5 - 5.1 mmol/L    Chloride 110 99 - 110 mmol/L    CO2 23 21 - 32 mmol/L    Anion Gap 15 3 - 16    Glucose 248 (H) 70 - 99 mg/dL     (HH) 7 - 20 mg/dL    CREATININE 3.8 (H) 0.8 - 1.3 mg/dL    GFR Non- 16 (A) >60    GFR  19 (A) >60    Calcium 9.2 8.3 - 10.6 mg/dL Urinalysis, reflex to microscopic   Result Value Ref Range    Color, UA Yellow Straw/Yellow    Clarity, UA Clear Clear    Glucose, Ur 100 (A) Negative mg/dL    Bilirubin Urine Negative Negative    Ketones, Urine Negative Negative mg/dL    Specific Gravity, UA 1.025 1.005 - 1.030    Blood, Urine LARGE (A) Negative    pH, UA 6.0 5.0 - 8.0    Protein, UA Negative Negative mg/dL    Urobilinogen, Urine 0.2 <2.0 E.U./dL    Nitrite, Urine Negative Negative    Leukocyte Esterase, Urine SMALL (A) Negative    Microscopic Examination YES     Urine Type NotGiven    Hepatic Function Panel   Result Value Ref Range    Total Protein 6.7 6.4 - 8.2 g/dL    Alb 4.1 3.4 - 5.0 g/dL    Alkaline Phosphatase 111 40 - 129 U/L    ALT 29 10 - 40 U/L    AST 17 15 - 37 U/L    Total Bilirubin 0.9 0.0 - 1.0 mg/dL    Bilirubin, Direct <0.2 0.0 - 0.3 mg/dL    Bilirubin, Indirect see below 0.0 - 1.0 mg/dL   Lipase   Result Value Ref Range    Lipase 40.0 13.0 - 60.0 U/L   Blood Gas, Venous   Result Value Ref Range    pH, Anjum 7.364 7.350 - 7.450    pCO2, Anjum 45.3 41.0 - 51.0 mmHg    pO2, Anjum 23.6 (L) 25.0 - 40.0 mmHg    HCO3, Venous 25.2 24.0 - 28.0 mmol/L    Base Excess, Anjum 0.1 -2.0 - 3.0 mmol/L    O2 Sat, Anjum 38 Not established %    Carboxyhemoglobin 1.5 0.0 - 1.5 %    MetHgb, Anjum 0.8 0.0 - 1.5 %    TC02 (Calc), Anjum 27 mmol/L    Hemoglobin, Anjum, Reduced 61.10 %   Lactic Acid, Plasma   Result Value Ref Range    Lactic Acid 2.0 0.4 - 2.0 mmol/L   EKG 12 Lead   Result Value Ref Range    Ventricular Rate 73 BPM    Atrial Rate 73 BPM    P-R Interval 156 ms    QRS Duration 92 ms    Q-T Interval 408 ms    QTc Calculation (Bazett) 449 ms    P Axis 62 degrees    R Axis 17 degrees    T Axis -9 degrees    Diagnosis       EKG performed in ER and to be interpreted by ER physician. Confirmed by MD, ER ([id number]),  Rachid Trevino ([id number]) on 10/16/2020 1:53:04 PM       ED BEDSIDE ULTRASOUND:  na    RECENT VITALS:  BP: (!) 173/91, Temp: 98.3 °F (36.8 °C), Pulse: 74,Resp: 16, SpO2: 99 %     Procedures     na    ED Course     Nursing Notes, Past Medical Hx, Past Surgical Hx, Social Hx, Allergies, and Family Hx were reviewed. The patient was given the followingmedications:  Orders Placed This Encounter   Medications    ziprasidone (GEODON) injection 10 mg    sodium chloride flush 0.9 % injection 10 mL    lactated ringers 1,000 mL infusion    midazolam (VERSED) injection 2 mg    lactated ringers infusion     LONA STRONG: cabinet override       CONSULTS:  Kaushik Marcus / ASSESSMENT / Eva Roz is a 66 y.o. male who presents with concern for abdominal pain. The patient is vitally stable. He does appear uncomfortable however his assessment is somewhat compromised by his agitation. Goals of care in the emerge department include symptomatic management as well as search for etiology. Given the difficult exam as well as history a broad work-up will be initiated including laboratory studies and CT imaging. Regarding the patient's agitation he will benefit from intramuscular Geodon to facilitate his ED work-up.    3:22 PM  The patient is reassessed. He remains hemodynamically stable and his agitation is resolved with medications given here. His ED evaluation is reviewed and is notable for an elevated BUN and creatinine suggesting acute kidney injury. His CT imaging is resulted and demonstrates a distended bladder. The patient has likely suffered an acute kidney injury secondary to obstructive uropathy. A Harris is ordered. It is unclear if the patient has significant prostatic disease to result in obstruction. Per staff at nursing home the patient reportedly has been making urine without difficulty and nursing staff today in the emergency department noted patient was producing urine spontaneously.   At this time there is little indication for further testing or treatment in the emergency department the patient will benefit from admission for further monitoring and IV fluids with Harris in place and resolution of his YANN. This patient was also evaluated by the attending physician. All care plans werediscussed and agreed upon. Clinical Impression     1. Acute renal failure, unspecified acute renal failure type ([facility] [address]        Disposition     PATIENT REFERRED TO:  No follow-up provider specified. DISCHARGE MEDICATIONS:  New Prescriptions    No medications on file       DISPOSITION    Admit for acute kidney injury and obstructive uropathy.      Lennox Sailor, MD  Resident  10/16/20 2839

## 2020-10-17 LAB
ALBUMIN SERPL-MCNC: 3.7 G/DL (ref 3.4–5)
ANION GAP SERPL CALCULATED.3IONS-SCNC: 12 MMOL/L (ref 3–16)
BASOPHILS ABSOLUTE: 0 K/UL (ref 0–0.2)
BASOPHILS RELATIVE PERCENT: 0.1 %
BUN BLDV-MCNC: 57 MG/DL (ref 7–20)
CALCIUM SERPL-MCNC: 9.3 MG/DL (ref 8.3–10.6)
CHLORIDE BLD-SCNC: 119 MMOL/L (ref 99–110)
CO2: 21 MMOL/L (ref 21–32)
CREAT SERPL-MCNC: 1.2 MG/DL (ref 0.8–1.3)
EOSINOPHILS ABSOLUTE: 0.1 K/UL (ref 0–0.6)
EOSINOPHILS RELATIVE PERCENT: 0.6 %
GFR AFRICAN AMERICAN: >60
GFR NON-AFRICAN AMERICAN: 59
GLUCOSE BLD-MCNC: 166 MG/DL (ref 70–99)
GLUCOSE BLD-MCNC: 182 MG/DL (ref 70–99)
GLUCOSE BLD-MCNC: 193 MG/DL (ref 70–99)
GLUCOSE BLD-MCNC: 196 MG/DL (ref 70–99)
GLUCOSE BLD-MCNC: 223 MG/DL (ref 70–99)
HCT VFR BLD CALC: 39.6 % (ref 40.5–52.5)
HEMOGLOBIN: 13.1 G/DL (ref 13.5–17.5)
LYMPHOCYTES ABSOLUTE: 0.9 K/UL (ref 1–5.1)
LYMPHOCYTES RELATIVE PERCENT: 9.7 %
MCH RBC QN AUTO: 30 PG (ref 26–34)
MCHC RBC AUTO-ENTMCNC: 33.1 G/DL (ref 31–36)
MCV RBC AUTO: 90.5 FL (ref 80–100)
MONOCYTES ABSOLUTE: 0.8 K/UL (ref 0–1.3)
MONOCYTES RELATIVE PERCENT: 9.1 %
NEUTROPHILS ABSOLUTE: 7.3 K/UL (ref 1.7–7.7)
NEUTROPHILS RELATIVE PERCENT: 80.5 %
PDW BLD-RTO: 14 % (ref 12.4–15.4)
PERFORMED ON: ABNORMAL
PHOSPHORUS: 3 MG/DL (ref 2.5–4.9)
PLATELET # BLD: 139 K/UL (ref 135–450)
PMV BLD AUTO: 9.6 FL (ref 5–10.5)
POTASSIUM SERPL-SCNC: 4.2 MMOL/L (ref 3.5–5.1)
RBC # BLD: 4.37 M/UL (ref 4.2–5.9)
SODIUM BLD-SCNC: 152 MMOL/L (ref 136–145)
URINE CULTURE, ROUTINE: NORMAL
WBC # BLD: 9 K/UL (ref 4–11)

## 2020-10-17 PROCEDURE — 80069 RENAL FUNCTION PANEL: CPT

## 2020-10-17 PROCEDURE — 1200000000 HC SEMI PRIVATE

## 2020-10-17 PROCEDURE — 90686 IIV4 VACC NO PRSV 0.5 ML IM: CPT | Performed by: INTERNAL MEDICINE

## 2020-10-17 PROCEDURE — 2580000003 HC RX 258: Performed by: INTERNAL MEDICINE

## 2020-10-17 PROCEDURE — 36415 COLL VENOUS BLD VENIPUNCTURE: CPT

## 2020-10-17 PROCEDURE — 2500000003 HC RX 250 WO HCPCS: Performed by: INTERNAL MEDICINE

## 2020-10-17 PROCEDURE — 85025 COMPLETE CBC W/AUTO DIFF WBC: CPT

## 2020-10-17 PROCEDURE — G0008 ADMIN INFLUENZA VIRUS VAC: HCPCS | Performed by: INTERNAL MEDICINE

## 2020-10-17 PROCEDURE — 6360000002 HC RX W HCPCS: Performed by: INTERNAL MEDICINE

## 2020-10-17 RX ORDER — HYDRALAZINE HYDROCHLORIDE 20 MG/ML
5 INJECTION INTRAMUSCULAR; INTRAVENOUS EVERY 6 HOURS PRN
Status: DISCONTINUED | OUTPATIENT
Start: 2020-10-17 | End: 2020-10-20 | Stop reason: HOSPADM

## 2020-10-17 RX ORDER — SODIUM CHLORIDE 450 MG/100ML
INJECTION, SOLUTION INTRAVENOUS CONTINUOUS
Status: DISCONTINUED | OUTPATIENT
Start: 2020-10-17 | End: 2020-10-18

## 2020-10-17 RX ORDER — LABETALOL HYDROCHLORIDE 5 MG/ML
10 INJECTION, SOLUTION INTRAVENOUS ONCE
Status: COMPLETED | OUTPATIENT
Start: 2020-10-17 | End: 2020-10-17

## 2020-10-17 RX ORDER — SODIUM CHLORIDE, SODIUM LACTATE, POTASSIUM CHLORIDE, CALCIUM CHLORIDE 600; 310; 30; 20 MG/100ML; MG/100ML; MG/100ML; MG/100ML
INJECTION, SOLUTION INTRAVENOUS CONTINUOUS
Status: DISCONTINUED | OUTPATIENT
Start: 2020-10-17 | End: 2020-10-17

## 2020-10-17 RX ADMIN — LORAZEPAM 1 MG: 2 INJECTION INTRAMUSCULAR; INTRAVENOUS at 01:26

## 2020-10-17 RX ADMIN — HEPARIN SODIUM 5000 UNITS: 5000 INJECTION INTRAVENOUS; SUBCUTANEOUS at 21:01

## 2020-10-17 RX ADMIN — CEFTRIAXONE 1 G: 1 INJECTION, POWDER, FOR SOLUTION INTRAMUSCULAR; INTRAVENOUS at 18:27

## 2020-10-17 RX ADMIN — INSULIN LISPRO 1 UNITS: 100 INJECTION, SOLUTION INTRAVENOUS; SUBCUTANEOUS at 21:02

## 2020-10-17 RX ADMIN — INSULIN LISPRO 1 UNITS: 100 INJECTION, SOLUTION INTRAVENOUS; SUBCUTANEOUS at 18:21

## 2020-10-17 RX ADMIN — INFLUENZA A VIRUS A/VICTORIA/2454/2019 IVR-207 (H1N1) ANTIGEN (PROPIOLACTONE INACTIVATED), INFLUENZA A VIRUS A/HONG KONG/2671/2019 IVR-208 (H3N2) ANTIGEN (PROPIOLACTONE INACTIVATED), INFLUENZA B VIRUS B/VICTORIA/705/2018 BVR-11 ANTIGEN (PROPIOLACTONE INACTIVATED), INFLUENZA B VIRUS B/PHUKET/3073/2013 BVR-1B ANTIGEN (PROPIOLACTONE INACTIVATED) 0.5 ML: 15; 15; 15; 15 INJECTION, SUSPENSION INTRAMUSCULAR at 09:26

## 2020-10-17 RX ADMIN — LORAZEPAM 1 MG: 2 INJECTION INTRAMUSCULAR; INTRAVENOUS at 21:02

## 2020-10-17 RX ADMIN — LABETALOL HYDROCHLORIDE 10 MG: 5 INJECTION, SOLUTION INTRAVENOUS at 06:56

## 2020-10-17 RX ADMIN — HEPARIN SODIUM 5000 UNITS: 5000 INJECTION INTRAVENOUS; SUBCUTANEOUS at 06:57

## 2020-10-17 RX ADMIN — INSULIN LISPRO 2 UNITS: 100 INJECTION, SOLUTION INTRAVENOUS; SUBCUTANEOUS at 13:36

## 2020-10-17 RX ADMIN — SODIUM CHLORIDE: 4.5 INJECTION, SOLUTION INTRAVENOUS at 18:32

## 2020-10-17 RX ADMIN — HEPARIN SODIUM 5000 UNITS: 5000 INJECTION INTRAVENOUS; SUBCUTANEOUS at 15:11

## 2020-10-17 RX ADMIN — SODIUM CHLORIDE, POTASSIUM CHLORIDE, SODIUM LACTATE AND CALCIUM CHLORIDE: 600; 310; 30; 20 INJECTION, SOLUTION INTRAVENOUS at 11:38

## 2020-10-17 RX ADMIN — LORAZEPAM 1 MG: 2 INJECTION INTRAMUSCULAR; INTRAVENOUS at 13:30

## 2020-10-17 RX ADMIN — INSULIN LISPRO 1 UNITS: 100 INJECTION, SOLUTION INTRAVENOUS; SUBCUTANEOUS at 09:27

## 2020-10-17 ASSESSMENT — PAIN SCALES - PAIN ASSESSMENT IN ADVANCED DEMENTIA (PAINAD)
TOTALSCORE: 0
FACIALEXPRESSION: 0
BODYLANGUAGE: 0
CONSOLABILITY: 0
BREATHING: 0
CONSOLABILITY: 0
NEGVOCALIZATION: 0
FACIALEXPRESSION: 0
CONSOLABILITY: 0
CONSOLABILITY: 0
BODYLANGUAGE: 0
NEGVOCALIZATION: 0
FACIALEXPRESSION: 0
CONSOLABILITY: 0
TOTALSCORE: 0
CONSOLABILITY: 0
CONSOLABILITY: 0
BODYLANGUAGE: 0
CONSOLABILITY: 0
BODYLANGUAGE: 0
TOTALSCORE: 0
FACIALEXPRESSION: 0
BREATHING: 0
NEGVOCALIZATION: 0
FACIALEXPRESSION: 0
FACIALEXPRESSION: 0
NEGVOCALIZATION: 0
BREATHING: 0
TOTALSCORE: 0
NEGVOCALIZATION: 0
BREATHING: 0
BODYLANGUAGE: 0
TOTALSCORE: 0
FACIALEXPRESSION: 0
NEGVOCALIZATION: 0
TOTALSCORE: 0
TOTALSCORE: 0
BREATHING: 0
BREATHING: 0
FACIALEXPRESSION: 0
BODYLANGUAGE: 0
BREATHING: 0
CONSOLABILITY: 0
BREATHING: 0
TOTALSCORE: 0
NEGVOCALIZATION: 0
FACIALEXPRESSION: 0
BREATHING: 0
BODYLANGUAGE: 0
TOTALSCORE: 0
TOTALSCORE: 0
NEGVOCALIZATION: 0
TOTALSCORE: 0
BODYLANGUAGE: 0
FACIALEXPRESSION: 0
BREATHING: 0
FACIALEXPRESSION: 0
CONSOLABILITY: 0
CONSOLABILITY: 0
FACIALEXPRESSION: 0
TOTALSCORE: 0
BODYLANGUAGE: 0
CONSOLABILITY: 0

## 2020-10-17 ASSESSMENT — PAIN SCALES - WONG BAKER

## 2020-10-17 ASSESSMENT — PAIN SCALES - GENERAL
PAINLEVEL_OUTOF10: 0

## 2020-10-17 NOTE — PLAN OF CARE
Problem: Gas Exchange - Impaired  Goal: Absence of hypoxia  Outcome: Met This Shift     Problem: Breathing Pattern - Ineffective  Goal: Ability to achieve and maintain a regular respiratory rate  Outcome: Met This Shift     Problem: Falls - Risk of:  Goal: Will remain free from falls  Description: Will remain free from falls  Outcome: Ongoing  Pt will remain free from accidental injury this shift. Pt has fall risk measures (fall risk bracelet, fall risk sign, fall risk cloth, non-slip socks, dome light on) in place. Pt bed is in low position, bed alarm on, bed wheels locked, call light within reach, bedside table within reach, chair wheels locked, chair alarm on, AVAsys in place for pulling at lines and equipment. Problem: Airway Clearance - Ineffective  Goal: Achieve or maintain patent airway  Outcome: Ongoing  RR and effort are WNL without use of accessory muscles. Oxygen saturations are in the mid 90's on RA. Lung sounds are clear to diminished throughout. Pt shows no signs of respiratory distress, cough, hemoptysis, SOB, orthopnea, or ARRIETA. Problem: Fatigue  Goal: Verbalize increase energy and improved vitality  Outcome: Ongoing  Pt is non-verbal responsiveness and does not  follow directions or answer questions appropriately but does not seem to be having any fatigue. Problem: Restraint Use - Nonviolent/Non-Self-Destructive Behavior:  Goal: Absence of restraint indications  Description: Absence of restraint indications  Outcome: Ongoing  Pt has been on BUE soft wrist restraints for pulling at lines and equipment. Will attempt to trial him with restraints of this afternoon to get out of restraints for return to [facility]. Problem: Confusion - Acute:  Goal: Absence of continued neurological deterioration signs and symptoms  Description: Absence of continued neurological deterioration signs and symptoms  Outcome: Ongoing  Pt is dementia at baseline and is confused. Pt seems to be at baseline. Pt has an AVAsys in room to monitor for safety.    Problem: Injury - Risk of, Physical Injury:  Goal: Will remain free from falls  Description: Will remain free from falls  Outcome: Ongoing

## 2020-10-17 NOTE — CONSULTS
NUTRITION ASSESSMENT  Admission Date: 10/16/2020     Type and Reason for Visit: Initial, Consult    NUTRITION RECOMMENDATIONS:   1. PO Diet: Continue general diet   2. ONS: Start Ensure     NUTRITION ASSESSMENT:  Pt is at risk for nutritional compromise AEB little-no po intakes and abdominal pain, for which pt was admitted. Hx of severe baseline dementia with aggression, in restraints. Per RN, pt has been refusing meals and water. Pt remains in restraints with hopes to remove and have pt d/c back to facility. Noted pt/spouse wanting comfort care at this time. Will add ONS for nutritional support and monitor per [facility]. MALNUTRITION ASSESSMENT  Context of Malnutrition: Acute Illness   Malnutrition Status: At risk for malnutrition (Comment)  Findings of the 6 clinical characteristics of malnutrition (Minimum of 2 out of 6 clinical characteristics is required to make the diagnosis of moderate or severe Protein Calorie Malnutrition based on AND/ASPEN Guidelines):  Energy Intake: Less than/equal to 50% of estimated energy requirements    Energy Intake Time: x1 day    Fluid Accumulation: No significant    Fluid Accumulation Location: No significant     Strength: Not Performed;  Not Measured     NUTRITION DIAGNOSIS   Problem: Problem #1: Inadequate oral intake  Etiology: Insufficient energy/nutrient consumption  Signs & Symptoms: Intake 0-25%    NUTRITION INTERVENTION  Food and/or Nutrient Delivery:Continue Current diet  or Start ONS   Nutrition education/counseling/coordination of care: Continue Inpatient Monitoring     NUTRITION MONITORING & EVALUATION:  Evaluation:Goals set   Goals:Goals: Pt will consume >/=50% of meals and ONS this admission   Monitoring: Meal Intake , Mental Status/Confusion  or Supplement Intake      OBJECTIVE DATA:  · Nutrition-Focused Physical Findings: no edema, restraints, dementia  · Wounds None      Past Medical History:   Diagnosis Date    Arthritis     Cancer ([facility] [address]     Glaucoma OAG Susp        ANTHROPOMETRICS  Current Height: 5' 10\" (177.8 cm)  Current Weight: 176 lb 5.9 oz (80 kg)    Admission weight: 178 lb 7 oz (80.9 kg)  Ideal Bodyweight 166 lb    Weight Changes SHUN-lack of data        BMI BMI (Calculated): 25.4    Wt Readings from Last 50 Encounters:   10/16/20 176 lb 5.9 oz (80 kg)       COMPARATIVE STANDARDS  Estimated Total Kcals/Day : 22-25  Current Bodyweight (80 kg) 1218-7706 kcal    Estimated Total Protein (g/day) : 1.3-1.5 Ideal Bodyweight  (75 kg)  g/day  Estimated Daily Total Fluid (ml/day): 1 mL/kcal per day     Food / Nutrition-Related History  Pre-Admission / Home Diet:  Pre-Admission/Home Diet: General   Home Supplements / Herbals:    none noted  Food Restrictions / Cultural Requests:    none noted    Current Nutrition Therapies   DIET GENERAL;     PO Intake: 0%   PO Supplement: None   PO Supplement Intake: None   IVF: LR @ 100 ml/hr     NUTRITION RISK LEVEL: Risk Level:  Moderate     Edilia Crane LD  Saurabh:  [phone]  Office:  [phone]

## 2020-10-17 NOTE — PROGRESS NOTES
Called and updated pt wife Leila Ybarra. She is aware of plan and has no additional questions at this time.     Electronically signed by Scotty Salinas RN on 10/17/2020 at 2:47 PM

## 2020-10-17 NOTE — PROGRESS NOTES
Hospitalist Progress Note      PCP: Geraldine Truong    Date of Admission: 10/16/2020    Clutching abdomen, shouting in pain     History Of Present Illness:       Bayron Monique is a 66 y.o. male with past medical history as below, including severe baseline dementia with aggression who presents with abdominal pain, was seen clutching abdomen and shouting.      CT showed b/l hydronephrosis, R moderate perinephric stranding. Mild prostate enlargement. Harris was placed with 1800 ml out. Patient noted to have YANN.      Pt is non-verbal, no distress. Subjective:   Patient is saying random numbers. Seems to have some tenderness to palpation of lower abdomen. Medications:  Reviewed    Infusion Medications    lactated ringers 100 mL/hr at 10/17/20 1335    dextrose       Scheduled Medications    sodium chloride flush  10 mL Intravenous 2 times per day    heparin (porcine)  5,000 Units Subcutaneous 3 times per day    insulin lispro  0-6 Units Subcutaneous TID WC    insulin lispro  0-3 Units Subcutaneous Nightly    cefTRIAXone (ROCEPHIN) IV  1 g Intravenous Q24H     PRN Meds: hydrALAZINE, sodium chloride flush, sodium chloride flush, acetaminophen **OR** acetaminophen, polyethylene glycol, promethazine **OR** ondansetron, glucose, dextrose, glucagon (rDNA), dextrose, LORazepam      Intake/Output Summary (Last 24 hours) at 10/17/2020 1810  Last data filed at 10/17/2020 1517  Gross per 24 hour   Intake 0 ml   Output 3675 ml   Net -3675 ml       Exam:    BP (!) 140/84   Pulse 73   Temp 98.3 °F (36.8 °C) (Axillary)   Resp 18   Ht 5' 10\" (1.778 m)   Wt 176 lb 5.9 oz (80 kg)   SpO2 95%   BMI 25.31 kg/m²     General appearance: Mild agitation. HEENT: Pupils equal, round, and reactive to light. Conjunctivae/corneas clear. Neck: Supple, with full range of motion. No jugular venous distention. Trachea midline. Respiratory:  Normal respiratory effort.  Clear to auscultation, bilaterally without Rales/Wheezes/Rhonchi. Cardiovascular: Regular rate and rhythm with normal S1/S2 without murmurs, rubs or gallops. Abdomen: Lower abdomen mild tenderness to palpation  Musculoskelatal: No clubbing, cyanosis or edema bilaterally. Skin: Skin color, texture, turgor normal.  No rashes or lesions. Neurologic:  Moving all extremities, saying random numbers, looking away from me, does not respond to questions. Labs:   Recent Labs     10/16/20  1243 10/17/20  0445   WBC 10.5 9.0   HGB 12.9* 13.1*   HCT 38.5* 39.6*    139     Recent Labs     10/16/20  1243 10/17/20  0445   * 152*   K 4.2 4.2    119*   CO2 23 21   * 57*   CREATININE 3.8* 1.2   CALCIUM 9.2 9.3   PHOS  --  3.0     Recent Labs     10/16/20  1243   AST 17   ALT 29   BILIDIR <0.2   BILITOT 0.9   ALKPHOS 111     No results for input(s): INR in the last 72 hours. No results for input(s): Nanette Belts in the last 72 hours. Studies:  CT ABDOMEN PELVIS WO CONTRAST Additional Contrast? None   Final Result      1. Mild to moderate bilateral hydronephrosis, right slightly greater than left. Moderate perinephric stranding on the right with small to moderate amount of posterior pararenal fluid. Proximal ureters are bilaterally prominent. No obstructing stones. 2. Marked distention of the urinary bladder. 3. Mild prostate enlargement. 4. Prior cholecystectomy. 5. Left adrenal nodule consistent with adenoma.              Assessment/Plan:    Active Hospital Problems    Diagnosis Date Noted    YANN (acute kidney injury) ([facility] [address] [N17.9] 10/16/2020   YANN due to urinary retention, improving  Severe Dementia  Abdominal pain secondary to urinary retention may be secondary to antipsychotics, infection  Possible pyelonephritis (stranding on CT, UA suggestive of infection)  Hypernatremia     PLAN:     Continue Harris  Continue IV fluids  Continue Rocephin, culture pending  Pharmacy med-rec complete  Will continue Ativan today and see if that makes him comfortable and can remove restraints  Currently not wanting to take PO so may be difficult to administer his other medications    Insulin sliding scale, low dose     Called and spoke with his wife who reports he would never want to have his life prolonged with his severe dementia. Discussed code status. She asked for his code to be [facility], will focus on treatment geared towards making him more comfortable so he can return to his facility.      Covid-19 pending    DVT Prophylaxis: Heparin Subq  Diet: DIET GENERAL;  Dietary Nutrition Supplements: Standard High Calorie Oral Supplement  Code Status: DNR-CC    PT/OT Eval Status:     Dispo - Inpatient    Sarah Agee DO

## 2020-10-17 NOTE — PLAN OF CARE
Problem: Restraint Use - Nonviolent/Non-Self-Destructive Behavior:  Goal: Absence of restraint indications  Description: Absence of restraint indications  10/16/2020 2207 by Lincoln Edge RN  Outcome: Ongoing  Pt on restraint for agressiveness and severe dementia. He is currently sleeping after receiving some dose of ativan. Will continue to monitor. Problem: Skin Integrity:  Goal: Will show no infection signs and symptoms  Description: Will show no infection signs and symptoms  Outcome: Ongoing   Pt with no new skin breakdown during this shift. Pt's skin assessed with  each shift assessment. Pt turned and repositioned as scheduled. Will continue to monitor. Problem: Falls - Risk of:  Goal: Will remain free from falls  Description: Will remain free from falls  10/16/2020 2207 by Lincoln Edge RN  Outcome: Ongoing  Pt remains without falls during this shift. Pt does not attempt to get OOB alone. Pt able to call out appropriately, call light within reach. Safety precautions in place . The bed is in lowest position, wheels are locked, and rails are up 2/4. Will continue with current care.

## 2020-10-18 LAB
ALBUMIN SERPL-MCNC: 3.6 G/DL (ref 3.4–5)
ANION GAP SERPL CALCULATED.3IONS-SCNC: 12 MMOL/L (ref 3–16)
BASOPHILS ABSOLUTE: 0 K/UL (ref 0–0.2)
BASOPHILS RELATIVE PERCENT: 0.1 %
BUN BLDV-MCNC: 31 MG/DL (ref 7–20)
CALCIUM SERPL-MCNC: 9 MG/DL (ref 8.3–10.6)
CHLORIDE BLD-SCNC: 119 MMOL/L (ref 99–110)
CO2: 28 MMOL/L (ref 21–32)
CREAT SERPL-MCNC: 0.8 MG/DL (ref 0.8–1.3)
EOSINOPHILS ABSOLUTE: 0 K/UL (ref 0–0.6)
EOSINOPHILS RELATIVE PERCENT: 0.2 %
GFR AFRICAN AMERICAN: >60
GFR NON-AFRICAN AMERICAN: >60
GLUCOSE BLD-MCNC: 145 MG/DL (ref 70–99)
GLUCOSE BLD-MCNC: 182 MG/DL (ref 70–99)
GLUCOSE BLD-MCNC: 185 MG/DL (ref 70–99)
GLUCOSE BLD-MCNC: 236 MG/DL (ref 70–99)
HCT VFR BLD CALC: 36.6 % (ref 40.5–52.5)
HEMOGLOBIN: 12.5 G/DL (ref 13.5–17.5)
LYMPHOCYTES ABSOLUTE: 0.8 K/UL (ref 1–5.1)
LYMPHOCYTES RELATIVE PERCENT: 8.1 %
MCH RBC QN AUTO: 30.1 PG (ref 26–34)
MCHC RBC AUTO-ENTMCNC: 34.2 G/DL (ref 31–36)
MCV RBC AUTO: 87.9 FL (ref 80–100)
MONOCYTES ABSOLUTE: 0.8 K/UL (ref 0–1.3)
MONOCYTES RELATIVE PERCENT: 8.2 %
NEUTROPHILS ABSOLUTE: 7.8 K/UL (ref 1.7–7.7)
NEUTROPHILS RELATIVE PERCENT: 83.4 %
PDW BLD-RTO: 13.3 % (ref 12.4–15.4)
PERFORMED ON: ABNORMAL
PHOSPHORUS: 2.6 MG/DL (ref 2.5–4.9)
PLATELET # BLD: 133 K/UL (ref 135–450)
PMV BLD AUTO: 8.5 FL (ref 5–10.5)
POTASSIUM SERPL-SCNC: 3.6 MMOL/L (ref 3.5–5.1)
RBC # BLD: 4.16 M/UL (ref 4.2–5.9)
SODIUM BLD-SCNC: 154 MMOL/L (ref 136–145)
SODIUM BLD-SCNC: 157 MMOL/L (ref 136–145)
SODIUM BLD-SCNC: 159 MMOL/L (ref 136–145)
WBC # BLD: 9.3 K/UL (ref 4–11)

## 2020-10-18 PROCEDURE — 1200000000 HC SEMI PRIVATE

## 2020-10-18 PROCEDURE — 85025 COMPLETE CBC W/AUTO DIFF WBC: CPT

## 2020-10-18 PROCEDURE — 80069 RENAL FUNCTION PANEL: CPT

## 2020-10-18 PROCEDURE — 6360000002 HC RX W HCPCS: Performed by: INTERNAL MEDICINE

## 2020-10-18 PROCEDURE — 84295 ASSAY OF SERUM SODIUM: CPT

## 2020-10-18 PROCEDURE — 2580000003 HC RX 258: Performed by: INTERNAL MEDICINE

## 2020-10-18 RX ORDER — DEXTROSE MONOHYDRATE 50 MG/ML
INJECTION, SOLUTION INTRAVENOUS CONTINUOUS
Status: DISCONTINUED | OUTPATIENT
Start: 2020-10-18 | End: 2020-10-20 | Stop reason: HOSPADM

## 2020-10-18 RX ORDER — OLANZAPINE 10 MG/1
5 INJECTION, POWDER, LYOPHILIZED, FOR SOLUTION INTRAMUSCULAR
Status: DISPENSED | OUTPATIENT
Start: 2020-10-18 | End: 2020-10-18

## 2020-10-18 RX ADMIN — LORAZEPAM 1 MG: 2 INJECTION INTRAMUSCULAR; INTRAVENOUS at 20:16

## 2020-10-18 RX ADMIN — DEXTROSE MONOHYDRATE: 50 INJECTION, SOLUTION INTRAVENOUS at 09:05

## 2020-10-18 RX ADMIN — HEPARIN SODIUM 5000 UNITS: 5000 INJECTION INTRAVENOUS; SUBCUTANEOUS at 22:15

## 2020-10-18 RX ADMIN — LORAZEPAM 1 MG: 2 INJECTION INTRAMUSCULAR; INTRAVENOUS at 14:18

## 2020-10-18 RX ADMIN — INSULIN LISPRO 1 UNITS: 100 INJECTION, SOLUTION INTRAVENOUS; SUBCUTANEOUS at 20:29

## 2020-10-18 RX ADMIN — INSULIN LISPRO 1 UNITS: 100 INJECTION, SOLUTION INTRAVENOUS; SUBCUTANEOUS at 13:09

## 2020-10-18 RX ADMIN — INSULIN LISPRO 2 UNITS: 100 INJECTION, SOLUTION INTRAVENOUS; SUBCUTANEOUS at 18:03

## 2020-10-18 RX ADMIN — HEPARIN SODIUM 5000 UNITS: 5000 INJECTION INTRAVENOUS; SUBCUTANEOUS at 13:09

## 2020-10-18 RX ADMIN — INSULIN LISPRO 1 UNITS: 100 INJECTION, SOLUTION INTRAVENOUS; SUBCUTANEOUS at 09:08

## 2020-10-18 ASSESSMENT — PAIN SCALES - PAIN ASSESSMENT IN ADVANCED DEMENTIA (PAINAD)
BODYLANGUAGE: 0
CONSOLABILITY: 0
TOTALSCORE: 2
BREATHING: 0
BREATHING: 0
NEGVOCALIZATION: 1
TOTALSCORE: 2
CONSOLABILITY: 0
FACIALEXPRESSION: 1
FACIALEXPRESSION: 1
NEGVOCALIZATION: 1
BREATHING: 0
CONSOLABILITY: 0
FACIALEXPRESSION: 1
TOTALSCORE: 2
NEGVOCALIZATION: 1

## 2020-10-18 ASSESSMENT — PAIN SCALES - WONG BAKER
WONGBAKER_NUMERICALRESPONSE: 0

## 2020-10-18 ASSESSMENT — PAIN SCALES - GENERAL
PAINLEVEL_OUTOF10: 0

## 2020-10-18 NOTE — PLAN OF CARE
Problem: Gas Exchange - Impaired  Goal: Absence of hypoxia  Outcome: Met This Shift     Problem: Breathing Pattern - Ineffective  Goal: Ability to achieve and maintain a regular respiratory rate  Outcome: Met This Shift     Problem: Falls - Risk of:  Goal: Will remain free from falls  Description: Will remain free from falls  Outcome: Ongoing  Pt will remain free from accidental injury this shift. Pt has fall risk measures (fall risk bracelet, fall risk sign, fall risk cloth, non-slip socks, dome light on) in place. Pt bed is in low position, bed alarm on, bed wheels locked, call light within reach, bedside table within reach, chair wheels locked, chair alarm on, AVAsys in place for pulling at lines and equipment. Problem: Airway Clearance - Ineffective  Goal: Achieve or maintain patent airway  Outcome: Ongoing  RR and effort are WNL without use of accessory muscles. Oxygen saturations are in the mid 90's on RA. Lung sounds are clear to diminished throughout. Pt shows no signs of respiratory distress, cough, hemoptysis, SOB, orthopnea, or ARRIETA. Problem: Fatigue  Goal: Verbalize increase energy and improved vitality  Outcome: Ongoing  Pt is non-verbal responsiveness and does not  follow directions or answer questions appropriately but does not seem to be having any fatigue. Problem: Confusion - Acute:  Goal: Absence of continued neurological deterioration signs and symptoms  Description: Absence of continued neurological deterioration signs and symptoms  Outcome: Ongoing  Pt is dementia at baseline and is confused. Pt seems to be at baseline. Pt has an AVAsys in room to monitor for safety.

## 2020-10-18 NOTE — PROGRESS NOTES
Hospitalist Progress Note      PCP: Dm Brand    Date of Admission: 10/16/2020    Clutching abdomen, shouting in pain     History Of Present Illness:       Flower Crum is a 66 y.o. male with past medical history as below, including severe baseline dementia with aggression who presents with abdominal pain, was seen clutching abdomen and shouting.      CT showed b/l hydronephrosis, R moderate perinephric stranding. Mild prostate enlargement. Harris was placed with 1800 ml out. Patient noted to have YANN.      Pt is non-verbal, no distress. Subjective:   Patient appears comfortable, speaking nonsensically. Medications:  Reviewed    Infusion Medications    dextrose      dextrose       Scheduled Medications    sodium chloride flush  10 mL Intravenous 2 times per day    heparin (porcine)  5,000 Units Subcutaneous 3 times per day    insulin lispro  0-6 Units Subcutaneous TID WC    insulin lispro  0-3 Units Subcutaneous Nightly     PRN Meds: hydrALAZINE, sodium chloride flush, sodium chloride flush, acetaminophen **OR** acetaminophen, polyethylene glycol, promethazine **OR** ondansetron, glucose, dextrose, glucagon (rDNA), dextrose, LORazepam      Intake/Output Summary (Last 24 hours) at 10/18/2020 0852  Last data filed at 10/17/2020 2358  Gross per 24 hour   Intake 2993.6 ml   Output 1625 ml   Net 1368.6 ml       Exam:    BP (!) 151/86   Pulse 62   Temp 98.3 °F (36.8 °C) (Axillary)   Resp 18   Ht 5' 10\" (1.778 m)   Wt 176 lb 5.9 oz (80 kg)   SpO2 97%   BMI 25.31 kg/m²     General appearance: Mild agitation. HEENT: Pupils equal, round, and reactive to light. Conjunctivae/corneas clear. Neck: Supple, with full range of motion. No jugular venous distention. Trachea midline. Respiratory:  Normal respiratory effort. Clear to auscultation, bilaterally without Rales/Wheezes/Rhonchi. Cardiovascular: Regular rate and rhythm with normal S1/S2 without murmurs, rubs or gallops.   Abdomen: Lower abdomen mild tenderness to palpation  Musculoskelatal: No clubbing, cyanosis or edema bilaterally. Skin: Skin color, texture, turgor normal.  No rashes or lesions. Neurologic:  Moving all extremities, saying random numbers, looking away from me, does not respond to questions. Labs:   Recent Labs     10/16/20  1243 10/17/20  0445 10/18/20  0411   WBC 10.5 9.0 9.3   HGB 12.9* 13.1* 12.5*   HCT 38.5* 39.6* 36.6*    139 133*     Recent Labs     10/16/20  1243 10/17/20  0445 10/18/20  0411   * 152* 159*   K 4.2 4.2 3.6    119* 119*   CO2 23 21 28   * 57* 31*   CREATININE 3.8* 1.2 0.8   CALCIUM 9.2 9.3 9.0   PHOS  --  3.0 2.6     Recent Labs     10/16/20  1243   AST 17   ALT 29   BILIDIR <0.2   BILITOT 0.9   ALKPHOS 111     No results for input(s): INR in the last 72 hours. No results for input(s): Omar Beery in the last 72 hours. Studies:  CT ABDOMEN PELVIS WO CONTRAST Additional Contrast? None   Final Result      1. Mild to moderate bilateral hydronephrosis, right slightly greater than left. Moderate perinephric stranding on the right with small to moderate amount of posterior pararenal fluid. Proximal ureters are bilaterally prominent. No obstructing stones. 2. Marked distention of the urinary bladder. 3. Mild prostate enlargement. 4. Prior cholecystectomy. 5. Left adrenal nodule consistent with adenoma.              Assessment/Plan:    Active Hospital Problems    Diagnosis Date Noted    YANN (acute kidney injury) ([facility] [address] [N17.9] 10/16/2020   YANN due to urinary retention, improving  Severe Dementia  Abdominal pain secondary to urinary retention   Hypernatremia     PLAN:     Continue Harris  Continue gentle IV fluids as below  Urine culture negative, off antibiotics  Pharmacy med-rec complete    Patient has been comfortable on Ativan, not requiring restraints  Currently not wanting to take PO so may be difficult to administer his other medications    Hypernatremia (?lab error) increased although he was on LR, then switched to half normal yesterday, will switch to D5.  -monitor    Insulin sliding scale, low dose    Covid-19 pending       Addendum: Patient had been pulling out IVs and not getting fluids. New IV placed, started on fluids. He is not wanting to swallow. Zyprexa added to see if can help with his agitation but keep him out of restraints. Called and spoke with his wife again today. She is interested in hospice care either inpatient or outpatient rather than return to [facility].     DVT Prophylaxis: Heparin Subq  Diet: DIET GENERAL;  Dietary Nutrition Supplements: Standard High Calorie Oral Supplement  Code Status: DNR-CC    PT/OT Eval Status: not ordered    Dispo - Inpatient    Sarah Agee DO

## 2020-10-18 NOTE — CARE COORDINATION
I spoke with Dr Greg Cary regarding discharge plans for this patient. He is from [facility] but his wife discussed hospice with Dr. Greg Cary. They live near [facility] which has an [place] [id number]. I called [facility] hospice [phone] to make a referral and am awaiting a return call. Electronically signed by Risa Pedersen RN on 10/18/2020 at 1:40 PM  [phone]    Received a call from [facility] and have faxed clinicals to [phone]. They will review clinicals and speak with his RN and will then contact his wife Ricardo Aguilar to discuss options. Electronically signed by Risa Pedersen RN on 10/18/2020 at 2:21 PM  [phone]    Spoke with [facility] and patient is not appropriate for IPU. His wife, Ricardo Aguilar has agreed to placement with hospice but was not sure of facilities. She mentioned [facility] but admissions is not available. I did send a referral to [address] in [facility] as well.      Electronically signed by Risa Pedersen RN on 10/18/2020 at 5:06 PM  [phone]

## 2020-10-18 NOTE — PLAN OF CARE
Problem: Falls - Risk of:  Goal: Will remain free from falls  Description: Will remain free from falls  10/18/2020 0037 by Beth Haji RN  Outcome: Ongoing  10/17/2020 1545 by Ward Sanchez RN  Outcome: Ongoing  Goal: Absence of physical injury  Description: Absence of physical injury  Outcome: Ongoing     Problem: Airway Clearance - Ineffective  Goal: Achieve or maintain patent airway  10/18/2020 0037 by Beth Haji RN  Outcome: Ongoing  10/17/2020 1545 by Ward Sanchez RN  Outcome: Ongoing     Problem: Gas Exchange - Impaired  Goal: Absence of hypoxia  10/18/2020 0037 by Beth Haji RN  Outcome: Ongoing  10/17/2020 1545 by Ward Sanchez RN  Outcome: Met This Shift  Goal: Promote optimal lung function  Outcome: Ongoing     Problem: Breathing Pattern - Ineffective  Goal: Ability to achieve and maintain a regular respiratory rate  10/18/2020 0037 by Beth Haji RN  Outcome: Ongoing  10/17/2020 1545 by Ward Sanchez RN  Outcome: Met This Shift

## 2020-10-19 LAB
ALBUMIN SERPL-MCNC: 3.5 G/DL (ref 3.4–5)
ANION GAP SERPL CALCULATED.3IONS-SCNC: 10 MMOL/L (ref 3–16)
BUN BLDV-MCNC: 25 MG/DL (ref 7–20)
CALCIUM SERPL-MCNC: 8.6 MG/DL (ref 8.3–10.6)
CHLORIDE BLD-SCNC: 116 MMOL/L (ref 99–110)
CO2: 28 MMOL/L (ref 21–32)
CREAT SERPL-MCNC: 0.7 MG/DL (ref 0.8–1.3)
GFR AFRICAN AMERICAN: >60
GFR NON-AFRICAN AMERICAN: >60
GLUCOSE BLD-MCNC: 151 MG/DL (ref 70–99)
GLUCOSE BLD-MCNC: 172 MG/DL (ref 70–99)
GLUCOSE BLD-MCNC: 187 MG/DL (ref 70–99)
GLUCOSE BLD-MCNC: 194 MG/DL (ref 70–99)
GLUCOSE BLD-MCNC: 195 MG/DL (ref 70–99)
GLUCOSE BLD-MCNC: 288 MG/DL (ref 70–99)
PERFORMED ON: ABNORMAL
PHOSPHORUS: 2.9 MG/DL (ref 2.5–4.9)
POTASSIUM SERPL-SCNC: 3.1 MMOL/L (ref 3.5–5.1)
SODIUM BLD-SCNC: 154 MMOL/L (ref 136–145)

## 2020-10-19 PROCEDURE — 6360000002 HC RX W HCPCS: Performed by: INTERNAL MEDICINE

## 2020-10-19 PROCEDURE — 2580000003 HC RX 258: Performed by: INTERNAL MEDICINE

## 2020-10-19 PROCEDURE — 1200000000 HC SEMI PRIVATE

## 2020-10-19 PROCEDURE — 80069 RENAL FUNCTION PANEL: CPT

## 2020-10-19 RX ORDER — LORAZEPAM 2 MG/ML
1 INJECTION INTRAMUSCULAR EVERY 4 HOURS PRN
Status: DISCONTINUED | OUTPATIENT
Start: 2020-10-19 | End: 2020-10-20

## 2020-10-19 RX ORDER — POTASSIUM CHLORIDE 7.45 MG/ML
10 INJECTION INTRAVENOUS
Status: COMPLETED | OUTPATIENT
Start: 2020-10-19 | End: 2020-10-19

## 2020-10-19 RX ADMIN — INSULIN LISPRO 3 UNITS: 100 INJECTION, SOLUTION INTRAVENOUS; SUBCUTANEOUS at 12:22

## 2020-10-19 RX ADMIN — HEPARIN SODIUM 5000 UNITS: 5000 INJECTION INTRAVENOUS; SUBCUTANEOUS at 06:10

## 2020-10-19 RX ADMIN — DEXTROSE MONOHYDRATE: 50 INJECTION, SOLUTION INTRAVENOUS at 11:27

## 2020-10-19 RX ADMIN — LORAZEPAM 1 MG: 2 INJECTION INTRAMUSCULAR; INTRAVENOUS at 23:22

## 2020-10-19 RX ADMIN — HYDRALAZINE HYDROCHLORIDE 5 MG: 20 INJECTION INTRAMUSCULAR; INTRAVENOUS at 00:05

## 2020-10-19 RX ADMIN — LORAZEPAM 1 MG: 2 INJECTION INTRAMUSCULAR; INTRAVENOUS at 14:18

## 2020-10-19 RX ADMIN — INSULIN LISPRO 1 UNITS: 100 INJECTION, SOLUTION INTRAVENOUS; SUBCUTANEOUS at 08:52

## 2020-10-19 RX ADMIN — POTASSIUM CHLORIDE 10 MEQ: 10 INJECTION, SOLUTION INTRAVENOUS at 15:43

## 2020-10-19 RX ADMIN — Medication 10 ML: at 22:11

## 2020-10-19 RX ADMIN — LORAZEPAM 1 MG: 2 INJECTION INTRAMUSCULAR; INTRAVENOUS at 04:28

## 2020-10-19 RX ADMIN — HEPARIN SODIUM 5000 UNITS: 5000 INJECTION INTRAVENOUS; SUBCUTANEOUS at 23:22

## 2020-10-19 RX ADMIN — LORAZEPAM 1 MG: 2 INJECTION INTRAMUSCULAR; INTRAVENOUS at 09:44

## 2020-10-19 RX ADMIN — POTASSIUM CHLORIDE 10 MEQ: 10 INJECTION, SOLUTION INTRAVENOUS at 14:16

## 2020-10-19 RX ADMIN — HYDRALAZINE HYDROCHLORIDE 5 MG: 20 INJECTION INTRAMUSCULAR; INTRAVENOUS at 12:21

## 2020-10-19 RX ADMIN — LORAZEPAM 1 MG: 2 INJECTION INTRAMUSCULAR; INTRAVENOUS at 18:43

## 2020-10-19 RX ADMIN — POTASSIUM CHLORIDE 10 MEQ: 10 INJECTION, SOLUTION INTRAVENOUS at 12:05

## 2020-10-19 RX ADMIN — POTASSIUM CHLORIDE 10 MEQ: 10 INJECTION, SOLUTION INTRAVENOUS at 09:47

## 2020-10-19 RX ADMIN — INSULIN LISPRO 1 UNITS: 100 INJECTION, SOLUTION INTRAVENOUS; SUBCUTANEOUS at 22:11

## 2020-10-19 RX ADMIN — INSULIN LISPRO 1 UNITS: 100 INJECTION, SOLUTION INTRAVENOUS; SUBCUTANEOUS at 16:27

## 2020-10-19 RX ADMIN — HEPARIN SODIUM 5000 UNITS: 5000 INJECTION INTRAVENOUS; SUBCUTANEOUS at 16:25

## 2020-10-19 ASSESSMENT — PAIN SCALES - PAIN ASSESSMENT IN ADVANCED DEMENTIA (PAINAD)
TOTALSCORE: 2
TOTALSCORE: 2
FACIALEXPRESSION: 1
TOTALSCORE: 2
BREATHING: 0
CONSOLABILITY: 0
BREATHING: 0
FACIALEXPRESSION: 1
TOTALSCORE: 2
TOTALSCORE: 0
CONSOLABILITY: 0
CONSOLABILITY: 0
BODYLANGUAGE: 0
TOTALSCORE: 2
FACIALEXPRESSION: 1
FACIALEXPRESSION: 1
BODYLANGUAGE: 0
CONSOLABILITY: 0
NEGVOCALIZATION: 1
BODYLANGUAGE: 0
FACIALEXPRESSION: 0
CONSOLABILITY: 2
NEGVOCALIZATION: 1
BODYLANGUAGE: 0
BREATHING: 0
FACIALEXPRESSION: 1
CONSOLABILITY: 2
BREATHING: 0
TOTALSCORE: 2
BODYLANGUAGE: 0
TOTALSCORE: 2
BODYLANGUAGE: 0
BODYLANGUAGE: 0
NEGVOCALIZATION: 2
BODYLANGUAGE: 1
TOTALSCORE: 2
BREATHING: 0
NEGVOCALIZATION: 1
NEGVOCALIZATION: 1
FACIALEXPRESSION: 1
NEGVOCALIZATION: 0
FACIALEXPRESSION: 1
NEGVOCALIZATION: 1
BODYLANGUAGE: 1
TOTALSCORE: 6
BREATHING: 0
FACIALEXPRESSION: 0
BODYLANGUAGE: 0
TOTALSCORE: 5
BODYLANGUAGE: 0
NEGVOCALIZATION: 1
TOTALSCORE: 2
BODYLANGUAGE: 0
BREATHING: 0
NEGVOCALIZATION: 1
NEGVOCALIZATION: 1
CONSOLABILITY: 0
CONSOLABILITY: 0
FACIALEXPRESSION: 1
NEGVOCALIZATION: 2
FACIALEXPRESSION: 1
NEGVOCALIZATION: 1
BREATHING: 0
FACIALEXPRESSION: 1
BREATHING: 0
CONSOLABILITY: 0

## 2020-10-19 ASSESSMENT — PAIN SCALES - GENERAL
PAINLEVEL_OUTOF10: 0
PAINLEVEL_OUTOF10: 0

## 2020-10-19 NOTE — PROGRESS NOTES
Rales/Wheezes/Rhonchi. Cardiovascular: Regular rate and rhythm with normal S1/S2 without murmurs, rubs or gallops. Abdomen: Lower abdomen mild tenderness to palpation  Musculoskelatal: No clubbing, cyanosis or edema bilaterally. Skin: Skin color, texture, turgor normal.  No rashes or lesions. Neurologic: moving extremities spontaneously. Responds to voice but speaking nonsensically. Labs:   Recent Labs     10/16/20  1243 10/17/20  0445 10/18/20  0411   WBC 10.5 9.0 9.3   HGB 12.9* 13.1* 12.5*   HCT 38.5* 39.6* 36.6*    139 133*     Recent Labs     10/17/20  0445 10/18/20  0411 10/18/20  1300 10/18/20  1900 10/19/20  0600   * 159* 157* 154* 154*   K 4.2 3.6  --   --  3.1*   * 119*  --   --  116*   CO2 21 28  --   --  28   BUN 57* 31*  --   --  25*   CREATININE 1.2 0.8  --   --  0.7*   CALCIUM 9.3 9.0  --   --  8.6   PHOS 3.0 2.6  --   --  2.9     Recent Labs     10/16/20  1243   AST 17   ALT 29   BILIDIR <0.2   BILITOT 0.9   ALKPHOS 111     No results for input(s): INR in the last 72 hours. No results for input(s): [place] in the last 72 hours. Studies:  CT ABDOMEN PELVIS WO CONTRAST Additional Contrast? None   Final Result      1. Mild to moderate bilateral hydronephrosis, right slightly greater than left. Moderate perinephric stranding on the right with small to moderate amount of posterior pararenal fluid. Proximal ureters are bilaterally prominent. No obstructing stones. 2. Marked distention of the urinary bladder. 3. Mild prostate enlargement. 4. Prior cholecystectomy. 5. Left adrenal nodule consistent with adenoma. Assessment/Plan: YANN due to urinary retention, improving  Abdominal pain secondary to urinary retention   Continue Harris  Continue gentle IV fluids as below  Urine culture negative, off antibiotics    Hypernatremia  He was on LR, then switched to half normal yesterday,   Now on  D5.   Cont to monitor sodium    Severe Dementia with

## 2020-10-19 NOTE — CARE COORDINATION
LUTHER spoke with Macho Mccullough, pt's wife, over the phone. Macho Mccullough states that pt has had a rapid decline in health, went on to explain multiple episodes that landed him in the [facility] the last time and then he was sent to [facility]. Patient is now refusing care and refusing to eat, and his wife states Ana Loza would not want to live this way\". She asked for hospice consult to [facility]. LUTHER called [facility] and left a VM also faxed a referral to [phone]. LUTHER spoke with Rory Villegas, at [facility], that states pt is hospice appropriate, but we would need to find him a LTC bed. LUTHER spoke with Pineda Guerrier at [facility], to see if they have a secure unit that would be appropriate for this pt.       Coiln Davis RN, BSN,   4th Floor Progressive Care Unit  [phone]

## 2020-10-19 NOTE — PROGRESS NOTES
Patient's wife Fab Bashir updated. All questions answered.      Electronically signed by Heather Milligan RN on 10/19/2020 at 7:42 AM

## 2020-10-20 VITALS
RESPIRATION RATE: 17 BRPM | HEIGHT: 70 IN | BODY MASS INDEX: 23.83 KG/M2 | SYSTOLIC BLOOD PRESSURE: 159 MMHG | OXYGEN SATURATION: 97 % | WEIGHT: 166.45 LBS | DIASTOLIC BLOOD PRESSURE: 73 MMHG | TEMPERATURE: 98.5 F | HEART RATE: 78 BPM

## 2020-10-20 LAB
ALBUMIN SERPL-MCNC: 3.5 G/DL (ref 3.4–5)
ANION GAP SERPL CALCULATED.3IONS-SCNC: 9 MMOL/L (ref 3–16)
BUN BLDV-MCNC: 21 MG/DL (ref 7–20)
CALCIUM SERPL-MCNC: 8.6 MG/DL (ref 8.3–10.6)
CHLORIDE BLD-SCNC: 110 MMOL/L (ref 99–110)
CO2: 29 MMOL/L (ref 21–32)
CREAT SERPL-MCNC: 0.8 MG/DL (ref 0.8–1.3)
GFR AFRICAN AMERICAN: >60
GFR NON-AFRICAN AMERICAN: >60
GLUCOSE BLD-MCNC: 159 MG/DL (ref 70–99)
GLUCOSE BLD-MCNC: 162 MG/DL (ref 70–99)
GLUCOSE BLD-MCNC: 186 MG/DL (ref 70–99)
PERFORMED ON: ABNORMAL
PERFORMED ON: ABNORMAL
PHOSPHORUS: 3.1 MG/DL (ref 2.5–4.9)
POTASSIUM SERPL-SCNC: 3.6 MMOL/L (ref 3.5–5.1)
SARS-COV-2, PCR: DETECTED
SODIUM BLD-SCNC: 148 MMOL/L (ref 136–145)

## 2020-10-20 PROCEDURE — 6360000002 HC RX W HCPCS: Performed by: INTERNAL MEDICINE

## 2020-10-20 PROCEDURE — U0003 INFECTIOUS AGENT DETECTION BY NUCLEIC ACID (DNA OR RNA); SEVERE ACUTE RESPIRATORY SYNDROME CORONAVIRUS 2 (SARS-COV-2) (CORONAVIRUS DISEASE [COVID-19]), AMPLIFIED PROBE TECHNIQUE, MAKING USE OF HIGH THROUGHPUT TECHNOLOGIES AS DESCRIBED BY CMS-2020-01-R: HCPCS

## 2020-10-20 PROCEDURE — 80069 RENAL FUNCTION PANEL: CPT

## 2020-10-20 PROCEDURE — 6370000000 HC RX 637 (ALT 250 FOR IP): Performed by: INTERNAL MEDICINE

## 2020-10-20 PROCEDURE — 2580000003 HC RX 258: Performed by: INTERNAL MEDICINE

## 2020-10-20 RX ORDER — MORPHINE SULFATE 100 MG/5ML
5 SOLUTION ORAL
Qty: 9 ML | Refills: 0 | Status: SHIPPED | OUTPATIENT
Start: 2020-10-20 | End: 2020-10-23

## 2020-10-20 RX ORDER — LORAZEPAM 2 MG/ML
1 CONCENTRATE ORAL
Status: DISCONTINUED | OUTPATIENT
Start: 2020-10-20 | End: 2020-10-20 | Stop reason: HOSPADM

## 2020-10-20 RX ORDER — MORPHINE SULFATE 20 MG/ML
5 SOLUTION ORAL
Status: DISCONTINUED | OUTPATIENT
Start: 2020-10-20 | End: 2020-10-20 | Stop reason: HOSPADM

## 2020-10-20 RX ORDER — LORAZEPAM 2 MG/ML
1 CONCENTRATE ORAL
Qty: 10 ML | Refills: 0 | Status: SHIPPED | OUTPATIENT
Start: 2020-10-20 | End: 2020-10-23

## 2020-10-20 RX ADMIN — Medication 10 ML: at 10:10

## 2020-10-20 RX ADMIN — MORPHINE SULFATE 5 MG: 100 SOLUTION ORAL at 19:18

## 2020-10-20 RX ADMIN — LORAZEPAM 1 MG: 2 INJECTION INTRAMUSCULAR; INTRAVENOUS at 10:11

## 2020-10-20 RX ADMIN — LORAZEPAM 1 MG: 2 INJECTION INTRAMUSCULAR; INTRAVENOUS at 03:21

## 2020-10-20 RX ADMIN — HEPARIN SODIUM 5000 UNITS: 5000 INJECTION INTRAVENOUS; SUBCUTANEOUS at 06:17

## 2020-10-20 RX ADMIN — MORPHINE SULFATE 5 MG: 100 SOLUTION ORAL at 14:27

## 2020-10-20 RX ADMIN — HEPARIN SODIUM 5000 UNITS: 5000 INJECTION INTRAVENOUS; SUBCUTANEOUS at 14:28

## 2020-10-20 RX ADMIN — INSULIN LISPRO 1 UNITS: 100 INJECTION, SOLUTION INTRAVENOUS; SUBCUTANEOUS at 10:11

## 2020-10-20 ASSESSMENT — PAIN SCALES - PAIN ASSESSMENT IN ADVANCED DEMENTIA (PAINAD)
BREATHING: 0
TOTALSCORE: 0
FACIALEXPRESSION: 0
FACIALEXPRESSION: 0
BREATHING: 0
CONSOLABILITY: 0
BODYLANGUAGE: 0
NEGVOCALIZATION: 0
BREATHING: 0
BODYLANGUAGE: 0
CONSOLABILITY: 2
NEGVOCALIZATION: 0
BODYLANGUAGE: 0
TOTALSCORE: 0
FACIALEXPRESSION: 0
TOTALSCORE: 3
FACIALEXPRESSION: 0
TOTALSCORE: 3
BREATHING: 0
CONSOLABILITY: 0
NEGVOCALIZATION: 1
NEGVOCALIZATION: 0
FACIALEXPRESSION: 0
BODYLANGUAGE: 0
NEGVOCALIZATION: 0
CONSOLABILITY: 0
BODYLANGUAGE: 0
CONSOLABILITY: 0
BODYLANGUAGE: 0
NEGVOCALIZATION: 1
CONSOLABILITY: 0
BREATHING: 0
TOTALSCORE: 0
BREATHING: 0
TOTALSCORE: 0
FACIALEXPRESSION: 0
BODYLANGUAGE: 0
FACIALEXPRESSION: 0
FACIALEXPRESSION: 0
TOTALSCORE: 0
NEGVOCALIZATION: 0
BREATHING: 0
NEGVOCALIZATION: 0
BODYLANGUAGE: 0
CONSOLABILITY: 0
TOTALSCORE: 0
BREATHING: 0
CONSOLABILITY: 2

## 2020-10-20 ASSESSMENT — PAIN SCALES - WONG BAKER
WONGBAKER_NUMERICALRESPONSE: 0

## 2020-10-20 ASSESSMENT — PAIN SCALES - GENERAL
PAINLEVEL_OUTOF10: 0
PAINLEVEL_OUTOF10: 3
PAINLEVEL_OUTOF10: 0
PAINLEVEL_OUTOF10: 3
PAINLEVEL_OUTOF10: 0

## 2020-10-20 NOTE — DISCHARGE INSTR - COC
Continuity of Care Form    Patient Name: Eve Frey   :  1945  MRN:  [id number]    Admit date:  10/16/2020  Discharge date: 10/20/2020  Code Status Order: DNR-CC   Advance Directives:      Admitting Physician:  Katheryn Garcia DO  PCP: Todd Peck    Discharging Nurse: [facility] Unit/Room#: 9310/1300-24  Discharging Unit Phone Number: [phone]    Emergency Contact:   Extended Emergency Contact Information  Primary Emergency Contact: Brandon Quintanilla [address] Phone: [phone]  Relation: Spouse    Past Surgical History:  Past Surgical History:   Procedure Laterality Date    CATARACT REMOVAL Left 1990    CATARACT REMOVAL Right 1991    CIRCUMCISION      HEEL SPUR SURGERY Right     YAG CAPSULOTOMY Left 93       Immunization History:   Immunization History   Administered Date(s) Administered    Influenza, Quadv, IM, PF (6 mo and older Fluzone, Flulaval, Fluarix, and 3 yrs and older Afluria) 10/17/2020       Active Problems:  Patient Active Problem List   Diagnosis Code    Bilateral pseudophakia Z96.1    Blurred vision H53.8    YANN (acute kidney injury) ([facility] [address] N17.9       Isolation/Infection:   Isolation            Droplet Plus          Patient Infection Status       Infection Onset Added Last Indicated Last Indicated By Review Planned Expiration Resolved Resolved By    COVID-19 Rule Out 10/16/20 10/16/20 10/20/20 COVID-19 (Ordered) 10/27/20 11/03/20      Resolved    COVID-19 Rule Out 10/12/20 10/12/20 10/13/20 COVID-19 (Ordered)   10/14/20 Rule-Out Test Resulted            Nurse Assessment:  Last Vital Signs: BP (!) 143/66   Pulse 74   Temp 98.2 °F (36.8 °C) (Axillary)   Resp 17   Ht 5' 10\" (1.778 m)   Wt 166 lb 7.2 oz (75.5 kg)   SpO2 99%   BMI 23.88 kg/m²     Last documented pain score (0-10 scale): Pain Level: 0  Last Weight:   Wt Readings from Last 1 Encounters:   10/20/20 166 lb 7.2 oz (75.5 kg)     Mental Status: applicable)   · Name:  · Address:  · Dialysis Schedule:  · Phone:  · Fax:    / signature: {Esignature:[id number]}    PHYSICIAN SECTION    Prognosis: Guarded    Condition at Discharge: Stable    Rehab Potential (if transferring to Rehab): Guarded    Recommended Labs or Other Treatments After Discharge: Comfort care    Physician Certification: I certify the above information and transfer of Anny Bueno  is necessary for the continuing treatment of the diagnosis listed and that he requires Hospice for greater 30 days.      Update Admission H&P: No change in H&P    PHYSICIAN SIGNATURE:  Electronically signed by Nevaeh Alejandra MD on 10/20/20 at 2:28 PM EDT

## 2020-10-20 NOTE — CARE COORDINATION
Case Management Assessment            Discharge Note                    Date / Time of Note: 10/20/2020 1:51 PM                  Discharge Note Completed by: Thiago Allred    Patient Name: Nikki Mcwilliams   YOB: 1945  Diagnosis: YANN (acute kidney injury) [facility]) [N17.9]   Date / Time: 10/16/2020 11:15 AM    Current PCP: Brad Mathew patient: No    Hospitalization in the last 30 days: No    Advance Directives:  Code Status: [address] DNR form completed and on chart: Yes    Financial:  Payor: [employer] / Plan: THE [employer] / Product Type: *No Product type* /      Pharmacy:    Edgar Baez, [address] [facility] [address] [facility]  [address] [facility]  Phone: [phone] Fax: [phone]      Assistance purchasing medications?:    Assistance provided by Case Management: None at this time    Does patient want to participate in local refill/ meds to beds program?:      Meds To Beds General Rules:  1. Can ONLY be done Monday- Friday between 8:30am-5pm  2. Prescription(s) must be in pharmacy by 3pm to be filled same day  3. Copy of patient's insurance/ prescription drug card and patient face sheet must be sent along with the prescription(s)  4. Cost of Rx cannot be added to hospital bill. If financial assistance is needed, please contact unit  or ;  or  CANNOT provide pharmacy voucher for patients co-pays  5.  Patients can then  the prescription on their way out of the hospital at discharge, or pharmacy can deliver to the bedside if staff is available. (payment due at time of pick-up or delivery - cash, check, or card accepted)     Able to afford home medications/ co-pay costs: Yes    ADLS:  Current PT AM-PAC Score:   /24  Current OT AM-PAC Score:   /24      DISCHARGE Disposition: Inpatient Hospice Unit: [facility] at discharge: Not Applicable  PHAM Completed: Not Indicated    Notification completed in HENS/PAS?:  Not Applicable    IMM Completed:   Not Indicated    Transportation:  Transportation PLAN for discharge: EMS transportation   Mode of Transport: Ambulance stretcher - BLS  Reason for medical transport: Bed confined: Meets the following criteria 1) unable to get out of bed without assistance or ambulate, 2) unable to safely sit up in a wheelchair, 3) unable to maintain erect seating position in a chair for time needed for transport  Name of Transport Company: [facility]  Phone:[phone]  Time of Transport: 7PM    Transport form completed: Yes    Home Care:  [address] ordered at discharge: Not [address]: Not Applicable  Orders faxed: No      Hospice Services:  Location: Inpatient Unit  Agency: [facility]  Phone: [phone]    Consents signed: family meeting at facility    Referrals made at [facility] for outpatient continued care:  Not Applicable    Additional CM Notes:   Patient is [facility] to [facility] [employer] [facility] [employer]. Zonia Puentes from [facility] working on discharge paperwork. Pt scheduled for transport via [facility] at [facility], nurse to call report to [phone]. The Plan for Transition of Care is related to the following treatment goals of YANN (acute kidney injury) ([facility] [address] [N17.9]    The Patient and/or patient representative Huong Vargas and his family were provided with a choice of provider and agrees with the discharge plan Yes    Freedom of choice list was provided with basic dialogue that supports the patient's individualized plan of care/goals and shares the quality data associated with the providers.  Yes    Care Transitions patient: No    Conchita Jones RN  [facility]  Case Management Department  Ph: [phone]  Fax: [phone]

## 2020-10-20 NOTE — FLOWSHEET NOTE
Attempted to obtain vital signs, pt extremely restless and unable to remain still.  Given PRN Ativan, will reattempt after pt calms

## 2020-10-20 NOTE — CARE COORDINATION
Hospice referral to [address]. LUTHER spoke with Ash Benitez, from [facility], she is meeting other team members to take a closer look at his case. They are also going to reach out to pt's wife. CM to follow with more information. LUTHER spoke with [place], she states they are able to take him to [facility]. She has spoke with pt's wife and she will sign consents. Rachael Sloan is on her way to hospital to facilitate intake process. Please call CM with any questions.     Cristo Vargas, RN, BSN,   4th Floor Progressive Care Unit  [phone]

## 2020-10-20 NOTE — DISCHARGE SUMMARY
Hospital Medicine Discharge Summary    Patient ID: Lakeshia López      Patient's PCP: Eugenie Perez    Admit Date: 10/16/2020     Discharge Date:   10/20/20    Admitting Physician: Daniel Ross DO     Discharge Physician: Keegan Greene MD     Discharge Diagnoses: Active Hospital Problems    Diagnosis Date Noted    YANN (acute kidney injury) ([address] [N17.9] 10/16/2020       The patient was seen and examined on day of discharge and this discharge summary is in conjunction with any daily progress note from day of discharge. Hospital Course:     Pt was admitted and treated for following:    Abdominal pain and YANN due to urinary retention:  Patient was started on IV antibiotics and IV fluid. Urine culture was sent. Harris was placed. Patient abdominal pain improved. YANN resolved. IV fluids were discontinued. Hyponatremia  Patient was on Ringer lactate which was switched to half-normal.  Which was then changed to D5. Sodium was monitored and trended down. Diabetes mellitus type 2:  Blood glucose was monitored and patient was placed on sliding scale insulin    Severe dementia with agitation  Patient was comfortable on Ativan and was not requiring restraints but Ativan had to be increased to every 4 hours. Patient did not want to take p.o. meds. Patient's condition was discussed with wife. She was interested in hospice care. Hospice was consulted. Patient is being discharged to hospice. Physical Exam Performed:     BP (!) 159/73   Pulse 78   Temp 98.5 °F (36.9 °C) (Axillary)   Resp 17   Ht 5' 10\" (1.778 m)   Wt 166 lb 7.2 oz (75.5 kg)   SpO2 97%   BMI 23.88 kg/m²       General appearance: In no apparent distress  Respiratory:  Normal respiratory effort. Clear to auscultation, bilaterally without Rales/Wheezes/Rhonchi. Cardiovascular:  Regular rate and rhythm with normal S1/S2 without murmurs, rubs or gallops.   Abdomen: Soft, non-tender, non-distended with normal bowel sounds. Musculoskeletal: Multiple lower extremity friction marks noted  Neurologic: Does not answer questions. Moving extremities spontaneously      Labs: For convenience and continuity at follow-up the following most recent labs are provided:      CBC:    Lab Results   Component Value Date    WBC 9.3 10/18/2020    HGB 12.5 10/18/2020    HCT 36.6 10/18/2020     10/18/2020       Renal:    Lab Results   Component Value Date     10/20/2020    K 3.6 10/20/2020    K 4.2 10/16/2020     10/20/2020    CO2 29 10/20/2020    BUN 21 10/20/2020    CREATININE 0.8 10/20/2020    CALCIUM 8.6 10/20/2020    PHOS 3.1 10/20/2020         Significant Diagnostic Studies    Radiology:   CT ABDOMEN PELVIS WO CONTRAST Additional Contrast? None   Final Result      1. Mild to moderate bilateral hydronephrosis, right slightly greater than left. Moderate perinephric stranding on the right with small to moderate amount of posterior pararenal fluid. Proximal ureters are bilaterally prominent. No obstructing stones. 2. Marked distention of the urinary bladder. 3. Mild prostate enlargement. 4. Prior cholecystectomy. 5. Left adrenal nodule consistent with adenoma. Consults:     IP CONSULT TO HOSPITALIST  IP CONSULT TO PHARMACY  IP CONSULT TO DIETITIAN  IP CONSULT TO SOCIAL WORK  IP CONSULT TO HOSPICE    Disposition: Inpatient hospice    Condition at Discharge: Stable    Discharge Instructions/Follow-up: Comfort care    Code Status:  DNR-CC     Activity: activity as tolerated    Diet: regular diet      Discharge Medications:     Current Discharge Medication List           Details   morphine sulfate 20 MG/ML concentrated oral solution Take 0.25 mLs by mouth every 2 hours as needed (For patient comfort) for up to 3 days.   Qty: 9 mL, Refills: 0    Comments: Reduce doses taken as pain becomes manageable  Associated Diagnoses: Hospice care      LORazepam (ATIVAN) 2 MG/ML concentrated solution Take 0.5 mLs by mouth every 2 hours as needed (anxiety, comfort) for up to 3 days. Qty: 10 mL, Refills: 0    Associated Diagnoses: Hospice care              Details   COD LIVER OIL PO Take 1 capsule by mouth daily             Time Spent on discharge is more than 20 min in the examination, evaluation, counseling and review of medications and discharge plan. Signed:    Ashley Sheldon MD   10/20/2020      Thank you Benny Nava for the opportunity to be involved in this patient's care. If you have any questions or concerns please feel free to contact me at [phone].

## 2020-10-20 NOTE — PROGRESS NOTES
Unable to obtain vital signs x 2 d/t pt agitation and continued restlessness and inability to remain still. PRN Ativan given for comfort, will reattempt vitals when pt has calmed down.

## 2020-10-21 NOTE — PROGRESS NOTES
Patient discharged per order of Dr. Lillian Sotelo. IV access removed without incident; Harris catheter to remain due to chronic urine retention. Pt transferred to [address] by ambulance. Pickup scheduled for 1900. Report called to [address] [place] [address] at 1700, to Mariola Sexton the receiving RN.